# Patient Record
Sex: FEMALE | Race: WHITE | NOT HISPANIC OR LATINO | Employment: OTHER | URBAN - METROPOLITAN AREA
[De-identification: names, ages, dates, MRNs, and addresses within clinical notes are randomized per-mention and may not be internally consistent; named-entity substitution may affect disease eponyms.]

---

## 2017-03-14 DIAGNOSIS — I10 ESSENTIAL HYPERTENSION: ICD-10-CM

## 2017-03-14 RX ORDER — RAMIPRIL 10 MG/1
CAPSULE ORAL
Qty: 90 CAPSULE | Refills: 1 | Status: SHIPPED | OUTPATIENT
Start: 2017-03-14 | End: 2017-10-02 | Stop reason: SDUPTHER

## 2017-06-15 ENCOUNTER — OFFICE VISIT (OUTPATIENT)
Dept: INTERNAL MEDICINE | Facility: CLINIC | Age: 67
End: 2017-06-15

## 2017-06-15 VITALS
BODY MASS INDEX: 31.78 KG/M2 | DIASTOLIC BLOOD PRESSURE: 80 MMHG | WEIGHT: 222 LBS | SYSTOLIC BLOOD PRESSURE: 140 MMHG | HEIGHT: 70 IN

## 2017-06-15 DIAGNOSIS — M25.50 MULTIPLE JOINT PAIN: ICD-10-CM

## 2017-06-15 DIAGNOSIS — R07.9 CHEST PAIN AT REST: Primary | ICD-10-CM

## 2017-06-15 DIAGNOSIS — E78.49 OTHER HYPERLIPIDEMIA: ICD-10-CM

## 2017-06-15 DIAGNOSIS — I10 ESSENTIAL HYPERTENSION: ICD-10-CM

## 2017-06-15 DIAGNOSIS — G47.33 OBSTRUCTIVE SLEEP APNEA SYNDROME: ICD-10-CM

## 2017-06-15 DIAGNOSIS — R00.2 PALPITATIONS: ICD-10-CM

## 2017-06-15 DIAGNOSIS — Z86.010 HX OF COLONIC POLYPS: ICD-10-CM

## 2017-06-15 DIAGNOSIS — K58.1 IRRITABLE BOWEL SYNDROME WITH CONSTIPATION: ICD-10-CM

## 2017-06-15 DIAGNOSIS — F51.01 PRIMARY INSOMNIA: ICD-10-CM

## 2017-06-15 DIAGNOSIS — K21.9 GASTROESOPHAGEAL REFLUX DISEASE, ESOPHAGITIS PRESENCE NOT SPECIFIED: ICD-10-CM

## 2017-06-15 DIAGNOSIS — Z12.31 ENCOUNTER FOR SCREENING MAMMOGRAM FOR BREAST CANCER: ICD-10-CM

## 2017-06-15 LAB
ALBUMIN SERPL-MCNC: 3.84 G/DL (ref 3.4–4.6)
ALBUMIN/GLOB SERPL: 1.3 G/DL
ALP SERPL-CCNC: 81 U/L (ref 46–116)
ALT SERPL W P-5'-P-CCNC: 31 U/L (ref 14–59)
ANION GAP SERPL CALCULATED.3IONS-SCNC: 10 MMOL/L
AST SERPL-CCNC: 25 U/L (ref 7–37)
BILIRUB SERPL-MCNC: 0.4 MG/DL (ref 0.2–1)
BUN BLD-MCNC: 21 MG/DL (ref 6–22)
BUN/CREAT SERPL: 25.6 (ref 7–25)
CALCIUM SPEC-SCNC: 8.9 MG/DL (ref 8.6–10.5)
CHLORIDE SERPL-SCNC: 105 MMOL/L (ref 95–107)
CO2 SERPL-SCNC: 28 MMOL/L (ref 23–32)
CREAT BLD-MCNC: 0.82 MG/DL (ref 0.55–1.02)
GFR SERPL CREATININE-BSD FRML MDRD: 70 ML/MIN/1.73
GLOBULIN UR ELPH-MCNC: 3 GM/DL
GLUCOSE BLD-MCNC: 93 MG/DL (ref 70–100)
POTASSIUM BLD-SCNC: 4.3 MMOL/L (ref 3.3–5.3)
PROT SERPL-MCNC: 6.8 G/DL (ref 6.3–8.4)
SODIUM BLD-SCNC: 143 MMOL/L (ref 136–145)

## 2017-06-15 PROCEDURE — 99214 OFFICE O/P EST MOD 30 MIN: CPT | Performed by: INTERNAL MEDICINE

## 2017-06-15 PROCEDURE — 93000 ELECTROCARDIOGRAM COMPLETE: CPT | Performed by: INTERNAL MEDICINE

## 2017-06-15 PROCEDURE — 80053 COMPREHEN METABOLIC PANEL: CPT | Performed by: INTERNAL MEDICINE

## 2017-06-15 NOTE — PROGRESS NOTES
"Subjective   Valery Zabala is a 66 y.o. female here for   Chief Complaint   Patient presents with   • Hyperlipidemia     6 month follow-up   • Hypertension     6 month follow-up   .    Vitals:    06/15/17 0812   BP: 140/80   BP Location: Left arm   Patient Position: Sitting   Cuff Size: Adult   Weight: 222 lb (101 kg)   Height: 70\" (177.8 cm)       Hyperlipidemia   This is a chronic problem. The current episode started more than 1 year ago. The problem is controlled. Recent lipid tests were reviewed and are normal. She has no history of chronic renal disease, diabetes, hypothyroidism or liver disease. Associated symptoms include chest pain. Pertinent negatives include no shortness of breath.   Hypertension   This is a chronic problem. The current episode started more than 1 year ago. The problem is unchanged. The problem is controlled. Associated symptoms include chest pain and palpitations (off & on for yrs - not assoc'd with c/p). Pertinent negatives include no shortness of breath. There is no history of chronic renal disease.   Chest Pain    This is a recurrent problem. The current episode started more than 1 month ago. The onset quality is gradual. The problem occurs rarely. The problem has been waxing and waning. The pain is present in the substernal region. The pain is mild. The quality of the pain is described as burning. The pain does not radiate. Associated symptoms include palpitations (off & on for yrs - not assoc'd with c/p). Pertinent negatives include no cough, fever, nausea or shortness of breath. The pain is aggravated by nothing. She has tried nothing for the symptoms.   Her past medical history is significant for hyperlipidemia.   Pertinent negatives for past medical history include no diabetes.        Encounter Diagnoses   Name Primary?   • Chest pain at rest Yes   • Other hyperlipidemia    • Essential hypertension    • Irritable bowel syndrome with constipation    • Multiple joint pain    • " Primary insomnia    • Palpitations    • Obstructive sleep apnea syndrome    • Hx of colonic polyps    • Gastroesophageal reflux disease, esophagitis presence not specified    • Encounter for screening mammogram for breast cancer        The following portions of the patient's history were reviewed and updated as appropriate: allergies, current medications, past social history and problem list.    Review of Systems   Constitutional: Positive for fatigue. Negative for chills and fever.   Respiratory: Negative for cough, shortness of breath and wheezing.    Cardiovascular: Positive for chest pain and palpitations (off & on for yrs - not assoc'd with c/p). Negative for leg swelling.   Gastrointestinal: Negative for nausea.   Musculoskeletal: Positive for arthralgias.   Psychiatric/Behavioral: Positive for sleep disturbance (ok with doxepin). Negative for dysphoric mood. The patient is not nervous/anxious.        Objective   Physical Exam   Constitutional: She appears well-developed and well-nourished. No distress.   Cardiovascular: Normal rate, regular rhythm and normal heart sounds.    Pulmonary/Chest: No respiratory distress. She has no wheezes. She has no rales. She exhibits no tenderness.   Musculoskeletal: She exhibits no edema.   Psychiatric: She has a normal mood and affect. Her behavior is normal.   Nursing note and vitals reviewed.      ECG 12 Lead  Date/Time: 6/15/2017 9:13 AM  Performed by: AMBER JACKSON  Authorized by: AMBER JACKSON   Interpreted by ED physician  Previous ECG: no previous ECG available  Rhythm: sinus rhythm  Rate: normal  Conduction: conduction normal  ST Segments: ST segments normal  T Waves: T waves normal  QRS axis: normal  Other: no other findings  Clinical impression: normal ECG            Assessment/Plan   Problem List Items Addressed This Visit        Unprioritized    Hypertension    Relevant Orders    Lipoprotein NMR    Comprehensive Metabolic Panel    Hyperlipidemia     Relevant Orders    Lipoprotein NMR    Comprehensive Metabolic Panel    Hx of colonic polyps    Palpitations    GERD (gastroesophageal reflux disease)    IBS (irritable bowel syndrome)    Insomnia    Sleep apnea     Wears CPAP nightly - helps fatigue.         Multiple joint pain      Other Visit Diagnoses     Chest pain at rest    -  Primary    Relevant Orders    Ambulatory Referral to Cardiology    Encounter for screening mammogram for breast cancer        Relevant Orders    Mammo Screening Bilateral With CAD       Chest pain (atypical) - EKG normal today.  Refer to cardiologist.   HTN - stable.  Call if bp over 140/90.   High chol - need diet & exercise.   Joint pain - f/u with ortho.     Screening carotid u/s showed mild to moderate stenosis (4/3/2017 at [facility]).  Discuss with cardiologist.      Return 4-6 mos for f/u & wellness.

## 2017-06-18 LAB
CHOLEST SERPL-MCNC: 208 MG/DL (ref 100–199)
HDL SERPL-SCNC: 31.7 UMOL/L
HDLC SERPL-MCNC: 53 MG/DL
LDL-P: 1542 NMOL/L
LDLC REAL SIZE PAT SERPL: 21.3 NM
LDLC SERPL CALC-MCNC: 138 MG/DL (ref 0–99)
LP-IR SCORE**: 41
SMALL LDL-P: 648 NMOL/L
TRIGL SERPL-MCNC: 85 MG/DL (ref 0–149)

## 2017-07-27 DIAGNOSIS — Z12.31 ENCOUNTER FOR SCREENING MAMMOGRAM FOR BREAST CANCER: Primary | ICD-10-CM

## 2017-08-17 ENCOUNTER — OFFICE VISIT (OUTPATIENT)
Dept: CARDIOLOGY | Facility: CLINIC | Age: 67
End: 2017-08-17

## 2017-08-17 VITALS
SYSTOLIC BLOOD PRESSURE: 160 MMHG | HEIGHT: 71 IN | WEIGHT: 223 LBS | BODY MASS INDEX: 31.22 KG/M2 | HEART RATE: 103 BPM | DIASTOLIC BLOOD PRESSURE: 80 MMHG

## 2017-08-17 DIAGNOSIS — R00.2 PALPITATIONS: ICD-10-CM

## 2017-08-17 DIAGNOSIS — E78.49 OTHER HYPERLIPIDEMIA: ICD-10-CM

## 2017-08-17 DIAGNOSIS — I10 ESSENTIAL HYPERTENSION: ICD-10-CM

## 2017-08-17 DIAGNOSIS — R07.2 PRECORDIAL PAIN: Primary | ICD-10-CM

## 2017-08-17 DIAGNOSIS — R01.1 MURMUR: ICD-10-CM

## 2017-08-17 DIAGNOSIS — G47.33 OBSTRUCTIVE SLEEP APNEA SYNDROME: ICD-10-CM

## 2017-08-17 PROCEDURE — 93000 ELECTROCARDIOGRAM COMPLETE: CPT | Performed by: INTERNAL MEDICINE

## 2017-08-17 PROCEDURE — 99204 OFFICE O/P NEW MOD 45 MIN: CPT | Performed by: INTERNAL MEDICINE

## 2017-08-17 NOTE — PROGRESS NOTES
Date of Office Visit: 2017  Encounter Provider: Luanne Levin MD  Place of Service: [facility] CARDIOLOGY  Patient Name: Valery Zabala  :1950      Patient ID:  Valery Zabala is a 67 y.o. female is here for chest pain, hypertension.         History of Present Illness    This is a pleasant female coming in for chest pain.  She describes the chest pain as coming on sometimes with activity but sometimes it is also worse when she's having problems with irritable bowel including gas and constipation.  She has stress study in  and at that time it was normal.  She's not had a workup since then.  She is treated for hypertension.  She's on Ultrase.  Her blood pressure tends to run 140s over 80s.  She has multiple medication allergies.  She has hyperlipidemia but it's ever been treated.  Her last lipid panel was in 2017 showing H show 53 and .  Her CMP at that time was normal.    She has obstructive sleep apnea and uses a BiPAP for that.  She's had a history of rheumatic fever.  She has no diabetes nails.  SNOMED myocardial infarction.  She has irritable bowel syndrome and gastroscope reflux disease.  She will of Protonix because of long-term issues with osteoporosis.  She takes Linzess for the irritable bowel syndrome.  She was taking Zantac but had some vomiting with this.  When she lost weight her GERD got better and the vomiting got better.    She has a history of kidney disease, asthma, emphysema, seizures, stroke, blood clot cancer.    She does have constant knee pain for which she gets steroid injections because of the severe valgus abnormality.  She is rather alcohol and does not smoke.  She is  has 2 stepchildren.    Her father had coronary bypass grafting.  Her mother had arrhythmia.  Her maternal grandfather  myocardial infarction in his 70s.    She vascular screening done 2017 and this did show mild bilateral carotid disease.  Her  abdominal aorta was normal and her peripheral vasculature was normal.    She is sedentary and has dyspnea with exertion.  Her energy level is stable.       Past Medical History:   Diagnosis Date   • Chest pain    • Chest pain at rest    • Essential hypertension    • Fatigue    • GERD (gastroesophageal reflux disease)    • History of anemia    • Hx of colonic polyps    • Hyperlipidemia    • Hypertension    • IBS (irritable bowel syndrome)    • Insomnia    • Irritable bowel syndrome with constipation    • Multiple joint pain    • MARANDA (obstructive sleep apnea)    • Palpitations    • Primary insomnia    • Rheumatic fever    • Sleep apnea     c pap   • Sleep disturbance    • Vitamin D deficiency          Past Surgical History:   Procedure Laterality Date   • APPENDECTOMY     • ARTERIOVENOUS FISTULA REPAIR  1986   • EYE SURGERY     • HYSTERECTOMY  1994   • SHOULDER SURGERY  1997   • TONSILLECTOMY         Current Outpatient Prescriptions on File Prior to Visit   Medication Sig Dispense Refill   • acyclovir (ZOVIRAX) 200 MG capsule TAKE 1 CAPSULE DAILY 90 capsule 2   • cholecalciferol (VITAMIN D3) 1000 UNITS tablet Take 2,000 Units by mouth daily.     • cycloSPORINE (RESTASIS) 0.05 % ophthalmic emulsion 1 drop 2 (two) times a day.     • docusate sodium (COLACE) 250 MG capsule Take 250 mg by mouth daily.     • doxepin (SINEquan) 25 MG capsule TAKE 1 CAPSULE DAILY 90 capsule 2   • fluticasone (FLONASE) 50 MCG/ACT nasal spray 2 sprays into each nostril daily. Administer 2 sprays in each nostril for each dose. 3 each 4   • Linaclotide (LINZESS) 145 MCG capsule Take 1 capsule by mouth Daily.     • Loratadine (CLARITIN) 10 MG capsule Take 1 capsule by mouth Daily.     • ramipril (ALTACE) 10 MG capsule TAKE 1 CAPSULE DAILY 90 capsule 1   • ranitidine (ZANTAC) 150 MG tablet Take 150 mg by mouth 2 (two) times a day.       No current facility-administered medications on file prior to visit.        Social History     Social History  "  • Marital status:      Spouse name: N/A   • Number of children: N/A   • Years of education: N/A     Occupational History   • Not on file.     Social History Main Topics   • Smoking status: Never Smoker   • Smokeless tobacco: Never Used      Comment: caffeine use   • Alcohol use Yes      Comment: rare   • Drug use: No   • Sexual activity: No     Other Topics Concern   • Not on file     Social History Narrative           Review of Systems   Constitution: Negative.   HENT: Negative for congestion and headaches.    Eyes: Negative for vision loss in left eye and vision loss in right eye.   Cardiovascular: Positive for chest pain, dyspnea on exertion and irregular heartbeat.   Respiratory: Negative.  Negative for cough, hemoptysis, shortness of breath, sleep disturbances due to breathing, snoring, sputum production and wheezing.    Endocrine: Negative.    Hematologic/Lymphatic: Negative.    Skin: Negative for poor wound healing and rash.   Musculoskeletal: Negative for falls, gout, muscle cramps and myalgias.   Gastrointestinal: Negative for abdominal pain, diarrhea, dysphagia, hematemesis, melena, nausea and vomiting.   Neurological: Negative for excessive daytime sleepiness, dizziness, light-headedness, loss of balance, seizures and vertigo.   Psychiatric/Behavioral: Negative for depression and substance abuse. The patient is not nervous/anxious.        Procedures    ECG 12 Lead  Date/Time: 8/17/2017 9:27 AM  Performed by: AFRICA COBB  Authorized by: AFRICA COBB   Comparison: compared with previous ECG   Similar to previous ECG  Rhythm: sinus rhythm  Clinical impression: normal ECG               Objective:      Vitals:    08/17/17 0831 08/17/17 0838   BP: 170/80 160/80   BP Location: Right arm Left arm   Patient Position: Sitting Sitting   Pulse: 103    Weight: 223 lb (101 kg)    Height: 71\" (180.3 cm)      Body mass index is 31.1 kg/(m^2).    Physical Exam   Constitutional: She is oriented to " person, place, and time. She appears well-developed and well-nourished. No distress.   HENT:   Head: Normocephalic and atraumatic.   Eyes: Conjunctivae are normal. No scleral icterus.   Neck: Neck supple. No JVD present. Carotid bruit is not present. No thyromegaly present.   Cardiovascular: Normal rate, regular rhythm, S1 normal, S2 normal and intact distal pulses.   No extrasystoles are present. PMI is not displaced.  Exam reveals no gallop.    Murmur heard.   Midsystolic murmur is present with a grade of 3/6  at the upper right sternal border, upper left sternal border  Pulses:       Carotid pulses are 2+ on the right side with bruit, and 2+ on the left side with bruit.       Radial pulses are 2+ on the right side, and 2+ on the left side.        Dorsalis pedis pulses are 2+ on the right side, and 2+ on the left side.        Posterior tibial pulses are 2+ on the right side, and 2+ on the left side.   Pulmonary/Chest: Effort normal and breath sounds normal. No respiratory distress. She has no wheezes. She has no rhonchi. She has no rales. She exhibits no tenderness.   Abdominal: Soft. Bowel sounds are normal. She exhibits no distension, no abdominal bruit and no mass. There is no tenderness.   Musculoskeletal: She exhibits no edema or deformity.   Lymphadenopathy:     She has no cervical adenopathy.   Neurological: She is alert and oriented to person, place, and time. No cranial nerve deficit.   Skin: Skin is warm and dry. No rash noted. She is not diaphoretic. No cyanosis. No pallor. Nails show no clubbing.   Psychiatric: She has a normal mood and affect. Judgment normal.   Vitals reviewed.      Lab Review:       Assessment:      Diagnosis Plan   1. Precordial pain     2. Essential hypertension     3. Other hyperlipidemia     4. Palpitations     5. Obstructive sleep apnea syndrome       1. Chest pain, atypical but has risks and dyspnea on exertion, set up stress nuclear study.  She is not sure that she can walk  due to knee problems.   2. Hypertension, try bystolic 5mg daily.   3. Hyperlipidemia, untreated.   4. Murmur, set up echo.  5. Bilateral carotid bruits.      Plan:       See back in 3 months.

## 2017-08-23 ENCOUNTER — HOSPITAL ENCOUNTER (OUTPATIENT)
Dept: CARDIOLOGY | Facility: HOSPITAL | Age: 67
Discharge: HOME | End: 2017-08-23
Attending: INTERNAL MEDICINE | Admitting: INTERNAL MEDICINE

## 2017-08-23 ENCOUNTER — HOSPITAL ENCOUNTER (OUTPATIENT)
Dept: CARDIOLOGY | Facility: HOSPITAL | Age: 67
Discharge: HOME | End: 2017-08-23
Attending: INTERNAL MEDICINE

## 2017-08-23 VITALS
WEIGHT: 223 LBS | SYSTOLIC BLOOD PRESSURE: 128 MMHG | HEIGHT: 71 IN | DIASTOLIC BLOOD PRESSURE: 78 MMHG | HEART RATE: 82 BPM | BODY MASS INDEX: 31.22 KG/M2

## 2017-08-23 DIAGNOSIS — I10 ESSENTIAL HYPERTENSION: ICD-10-CM

## 2017-08-23 DIAGNOSIS — E78.49 OTHER HYPERLIPIDEMIA: ICD-10-CM

## 2017-08-23 DIAGNOSIS — R01.1 MURMUR: ICD-10-CM

## 2017-08-23 DIAGNOSIS — R07.2 PRECORDIAL PAIN: ICD-10-CM

## 2017-08-23 LAB
ASCENDING AORTA: 2.9 CM
BH CV ECHO MEAS - ACS: 1.8 CM
BH CV ECHO MEAS - AO MAX PG (FULL): 4.4 MMHG
BH CV ECHO MEAS - AO MAX PG: 9.7 MMHG
BH CV ECHO MEAS - AO MEAN PG (FULL): 3.3 MMHG
BH CV ECHO MEAS - AO MEAN PG: 7 MMHG
BH CV ECHO MEAS - AO ROOT AREA (BSA CORRECTED): 1.4
BH CV ECHO MEAS - AO ROOT AREA: 7.5 CM^2
BH CV ECHO MEAS - AO ROOT DIAM: 3.1 CM
BH CV ECHO MEAS - AO V2 MAX: 155.7 CM/SEC
BH CV ECHO MEAS - AO V2 MEAN: 129.4 CM/SEC
BH CV ECHO MEAS - AO V2 VTI: 40.1 CM
BH CV ECHO MEAS - AVA(I,A): 2.4 CM^2
BH CV ECHO MEAS - AVA(I,D): 2.4 CM^2
BH CV ECHO MEAS - AVA(V,A): 2.3 CM^2
BH CV ECHO MEAS - AVA(V,D): 2.3 CM^2
BH CV ECHO MEAS - BSA(HAYCOCK): 2.3 M^2
BH CV ECHO MEAS - BSA: 2.2 M^2
BH CV ECHO MEAS - BZI_BMI: 31.1 KILOGRAMS/M^2
BH CV ECHO MEAS - BZI_METRIC_HEIGHT: 180.3 CM
BH CV ECHO MEAS - BZI_METRIC_WEIGHT: 101.2 KG
BH CV ECHO MEAS - CONTRAST EF (2CH): 62 ML/M^2
BH CV ECHO MEAS - CONTRAST EF 4CH: 65.3 ML/M^2
BH CV ECHO MEAS - EDV(MOD-SP2): 100 ML
BH CV ECHO MEAS - EDV(MOD-SP4): 101 ML
BH CV ECHO MEAS - EDV(TEICH): 195.6 ML
BH CV ECHO MEAS - EF(CUBED): 70.4 %
BH CV ECHO MEAS - EF(MOD-SP2): 62 %
BH CV ECHO MEAS - EF(MOD-SP4): 65.3 %
BH CV ECHO MEAS - EF(TEICH): 61 %
BH CV ECHO MEAS - ESV(MOD-SP2): 38 ML
BH CV ECHO MEAS - ESV(MOD-SP4): 35 ML
BH CV ECHO MEAS - ESV(TEICH): 76.3 ML
BH CV ECHO MEAS - FS: 33.3 %
BH CV ECHO MEAS - IVS/LVPW: 1
BH CV ECHO MEAS - IVSD: 1 CM
BH CV ECHO MEAS - LAT PEAK E' VEL: 10 CM/SEC
BH CV ECHO MEAS - LV DIASTOLIC VOL/BSA (35-75): 45.7 ML/M^2
BH CV ECHO MEAS - LV MASS(C)D: 262.2 GRAMS
BH CV ECHO MEAS - LV MASS(C)DI: 118.7 GRAMS/M^2
BH CV ECHO MEAS - LV MAX PG: 5.3 MMHG
BH CV ECHO MEAS - LV MEAN PG: 3.7 MMHG
BH CV ECHO MEAS - LV SYSTOLIC VOL/BSA (12-30): 15.8 ML/M^2
BH CV ECHO MEAS - LV V1 MAX: 115 CM/SEC
BH CV ECHO MEAS - LV V1 MEAN: 92.7 CM/SEC
BH CV ECHO MEAS - LV V1 VTI: 30.7 CM
BH CV ECHO MEAS - LVIDD: 6.2 CM
BH CV ECHO MEAS - LVIDS: 4.1 CM
BH CV ECHO MEAS - LVLD AP2: 7.4 CM
BH CV ECHO MEAS - LVLD AP4: 8 CM
BH CV ECHO MEAS - LVLS AP2: 6.2 CM
BH CV ECHO MEAS - LVLS AP4: 6.8 CM
BH CV ECHO MEAS - LVOT AREA (M): 3.1 CM^2
BH CV ECHO MEAS - LVOT AREA: 3.1 CM^2
BH CV ECHO MEAS - LVOT DIAM: 2 CM
BH CV ECHO MEAS - LVPWD: 1 CM
BH CV ECHO MEAS - MED PEAK E' VEL: 10 CM/SEC
BH CV ECHO MEAS - MR MAX PG: 30 MMHG
BH CV ECHO MEAS - MR MAX VEL: 273.6 CM/SEC
BH CV ECHO MEAS - MV A DUR: 0.11 SEC
BH CV ECHO MEAS - MV A MAX VEL: 91.2 CM/SEC
BH CV ECHO MEAS - MV DEC SLOPE: 584.8 CM/SEC^2
BH CV ECHO MEAS - MV DEC TIME: 0.18 SEC
BH CV ECHO MEAS - MV E MAX VEL: 101.9 CM/SEC
BH CV ECHO MEAS - MV E/A: 1.1
BH CV ECHO MEAS - MV MAX PG: 4.1 MMHG
BH CV ECHO MEAS - MV MEAN PG: 2.4 MMHG
BH CV ECHO MEAS - MV P1/2T MAX VEL: 100.4 CM/SEC
BH CV ECHO MEAS - MV P1/2T: 50.3 MSEC
BH CV ECHO MEAS - MV V2 MAX: 101 CM/SEC
BH CV ECHO MEAS - MV V2 MEAN: 74 CM/SEC
BH CV ECHO MEAS - MV V2 VTI: 31.8 CM
BH CV ECHO MEAS - MVA P1/2T LCG: 2.2 CM^2
BH CV ECHO MEAS - MVA(P1/2T): 4.4 CM^2
BH CV ECHO MEAS - MVA(VTI): 3 CM^2
BH CV ECHO MEAS - PA ACC TIME: 0.14 SEC
BH CV ECHO MEAS - PA MAX PG (FULL): 1.3 MMHG
BH CV ECHO MEAS - PA MAX PG: 3.5 MMHG
BH CV ECHO MEAS - PA PR(ACCEL): 14 MMHG
BH CV ECHO MEAS - PA V2 MAX: 93.4 CM/SEC
BH CV ECHO MEAS - PULM A REVS DUR: 0.12 SEC
BH CV ECHO MEAS - PULM A REVS VEL: 28.1 CM/SEC
BH CV ECHO MEAS - PULM DIAS VEL: 37.3 CM/SEC
BH CV ECHO MEAS - PULM S/D: 1.9
BH CV ECHO MEAS - PULM SYS VEL: 71.8 CM/SEC
BH CV ECHO MEAS - PVA(V,A): 2.8 CM^2
BH CV ECHO MEAS - PVA(V,D): 2.8 CM^2
BH CV ECHO MEAS - QP/QS: 0.58
BH CV ECHO MEAS - RAP SYSTOLE: 3 MMHG
BH CV ECHO MEAS - RV MAX PG: 2.2 MMHG
BH CV ECHO MEAS - RV MEAN PG: 1.3 MMHG
BH CV ECHO MEAS - RV V1 MAX: 74.7 CM/SEC
BH CV ECHO MEAS - RV V1 MEAN: 53.3 CM/SEC
BH CV ECHO MEAS - RV V1 VTI: 16 CM
BH CV ECHO MEAS - RVOT AREA: 3.5 CM^2
BH CV ECHO MEAS - RVOT DIAM: 2.1 CM
BH CV ECHO MEAS - RVSP: 33 MMHG
BH CV ECHO MEAS - SI(AO): 136.1 ML/M^2
BH CV ECHO MEAS - SI(CUBED): 76.8 ML/M^2
BH CV ECHO MEAS - SI(LVOT): 43.4 ML/M^2
BH CV ECHO MEAS - SI(MOD-SP2): 28.1 ML/M^2
BH CV ECHO MEAS - SI(MOD-SP4): 29.9 ML/M^2
BH CV ECHO MEAS - SI(TEICH): 54 ML/M^2
BH CV ECHO MEAS - SUP REN AO DIAM: 2.1 CM
BH CV ECHO MEAS - SV(AO): 300.6 ML
BH CV ECHO MEAS - SV(CUBED): 169.6 ML
BH CV ECHO MEAS - SV(LVOT): 95.9 ML
BH CV ECHO MEAS - SV(MOD-SP2): 62 ML
BH CV ECHO MEAS - SV(MOD-SP4): 66 ML
BH CV ECHO MEAS - SV(RVOT): 55.3 ML
BH CV ECHO MEAS - SV(TEICH): 119.3 ML
BH CV ECHO MEAS - TAPSE (>1.6): 2.3 CM2
BH CV ECHO MEAS - TR MAX VEL: 271.9 CM/SEC
BH CV NUCLEAR PRIOR STUDY: 3
BH CV STRESS BP STAGE 1: NORMAL
BH CV STRESS BP STAGE 2: NORMAL
BH CV STRESS DURATION MIN STAGE 1: 3
BH CV STRESS DURATION MIN STAGE 2: 3
BH CV STRESS DURATION SEC STAGE 1: 0
BH CV STRESS DURATION SEC STAGE 2: 0
BH CV STRESS GRADE STAGE 1: 10
BH CV STRESS GRADE STAGE 2: 12
BH CV STRESS HR STAGE 1: 126
BH CV STRESS HR STAGE 2: 148
BH CV STRESS METS STAGE 1: 5
BH CV STRESS METS STAGE 2: 7.5
BH CV STRESS PROTOCOL 1: NORMAL
BH CV STRESS RECOVERY BP: NORMAL MMHG
BH CV STRESS RECOVERY HR: 99 BPM
BH CV STRESS SPEED STAGE 1: 1.7
BH CV STRESS SPEED STAGE 2: 2.5
BH CV STRESS STAGE 1: 1
BH CV STRESS STAGE 2: 2
BH CV XLRA - RV BASE: 3 CM
BH CV XLRA - TDI S': 13 CM/SEC
E/E' RATIO: 10
LEFT ATRIUM VOLUME INDEX: 30 ML/M2
LV EF 2D ECHO EST: 65 %
LV EF NUC BP: 70 %
MAXIMAL PREDICTED HEART RATE: 153 BPM
PERCENT MAX PREDICTED HR: 96.73 %
SINUS: 2.8 CM
STJ: 2.7 CM
STRESS BASELINE BP: NORMAL MMHG
STRESS BASELINE HR: 83 BPM
STRESS PERCENT HR: 114 %
STRESS POST ESTIMATED WORKLOAD: 7.5 METS
STRESS POST EXERCISE DUR MIN: 6 MIN
STRESS POST EXERCISE DUR SEC: 0 SEC
STRESS POST PEAK BP: NORMAL MMHG
STRESS POST PEAK HR: 148 BPM
STRESS TARGET HR: 130 BPM

## 2017-08-23 PROCEDURE — 93017 CV STRESS TEST TRACING ONLY: CPT

## 2017-08-23 PROCEDURE — A9502 TC99M TETROFOSMIN: HCPCS | Performed by: INTERNAL MEDICINE

## 2017-08-23 PROCEDURE — 78452 HT MUSCLE IMAGE SPECT MULT: CPT

## 2017-08-23 PROCEDURE — 93306 TTE W/DOPPLER COMPLETE: CPT | Performed by: INTERNAL MEDICINE

## 2017-08-23 PROCEDURE — 93016 CV STRESS TEST SUPVJ ONLY: CPT | Performed by: INTERNAL MEDICINE

## 2017-08-23 PROCEDURE — 0 TECHNETIUM TETROFOSMIN KIT: Performed by: INTERNAL MEDICINE

## 2017-08-23 PROCEDURE — 93018 CV STRESS TEST I&R ONLY: CPT | Performed by: INTERNAL MEDICINE

## 2017-08-23 PROCEDURE — 78452 HT MUSCLE IMAGE SPECT MULT: CPT | Performed by: INTERNAL MEDICINE

## 2017-08-23 PROCEDURE — 93306 TTE W/DOPPLER COMPLETE: CPT

## 2017-08-23 RX ADMIN — TETROFOSMIN 1 DOSE: 1.38 INJECTION, POWDER, LYOPHILIZED, FOR SOLUTION INTRAVENOUS at 11:45

## 2017-08-23 RX ADMIN — TETROFOSMIN 1 DOSE: 1.38 INJECTION, POWDER, LYOPHILIZED, FOR SOLUTION INTRAVENOUS at 10:40

## 2017-09-15 ENCOUNTER — TELEPHONE (OUTPATIENT)
Dept: CARDIOLOGY | Facility: CLINIC | Age: 67
End: 2017-09-15

## 2017-09-15 NOTE — TELEPHONE ENCOUNTER
Pt called and states that since starting her with Bystolic 5mg, her legs are swelling, sleeping a lot in the afternoon, fatigue. Pt denies SOA, chest pain. BP reading 114/63, 134/66. Pt wants to know if she can stop  her Bystolic and if she can come in and see you to discusse other option?........please advise    Last OV 8/17/17 and next OV 11/21/17    Thanks  Peg HERRERA

## 2017-10-02 DIAGNOSIS — I10 ESSENTIAL HYPERTENSION: ICD-10-CM

## 2017-10-02 RX ORDER — RAMIPRIL 10 MG/1
CAPSULE ORAL
Qty: 90 CAPSULE | Refills: 3 | Status: SHIPPED | OUTPATIENT
Start: 2017-10-02 | End: 2018-09-25 | Stop reason: SDUPTHER

## 2017-10-02 RX ORDER — DOXEPIN HYDROCHLORIDE 25 MG/1
CAPSULE ORAL
Qty: 90 CAPSULE | Refills: 2 | Status: SHIPPED | OUTPATIENT
Start: 2017-10-02 | End: 2018-04-29 | Stop reason: SDUPTHER

## 2017-10-02 RX ORDER — ACYCLOVIR 200 MG/1
CAPSULE ORAL
Qty: 90 CAPSULE | Refills: 2 | Status: SHIPPED | OUTPATIENT
Start: 2017-10-02 | End: 2018-04-29 | Stop reason: SDUPTHER

## 2017-10-13 RX ORDER — NEBIVOLOL 5 MG/1
2.5 TABLET ORAL DAILY
Qty: 28 TABLET | Refills: 0 | Status: SHIPPED | OUTPATIENT
Start: 2017-10-13 | End: 2017-11-27 | Stop reason: SDUPTHER

## 2017-11-02 ENCOUNTER — OFFICE VISIT (OUTPATIENT)
Dept: INTERNAL MEDICINE | Facility: CLINIC | Age: 67
End: 2017-11-02

## 2017-11-02 VITALS
OXYGEN SATURATION: 98 % | HEART RATE: 71 BPM | TEMPERATURE: 97.8 F | BODY MASS INDEX: 33.21 KG/M2 | HEIGHT: 70 IN | DIASTOLIC BLOOD PRESSURE: 78 MMHG | WEIGHT: 232 LBS | SYSTOLIC BLOOD PRESSURE: 118 MMHG | RESPIRATION RATE: 17 BRPM

## 2017-11-02 DIAGNOSIS — I10 ESSENTIAL HYPERTENSION: Primary | ICD-10-CM

## 2017-11-02 DIAGNOSIS — M25.50 MULTIPLE JOINT PAIN: ICD-10-CM

## 2017-11-02 DIAGNOSIS — Z00.00 INITIAL MEDICARE ANNUAL WELLNESS VISIT: ICD-10-CM

## 2017-11-02 DIAGNOSIS — E55.9 VITAMIN D DEFICIENCY: ICD-10-CM

## 2017-11-02 DIAGNOSIS — G47.33 OBSTRUCTIVE SLEEP APNEA SYNDROME: ICD-10-CM

## 2017-11-02 DIAGNOSIS — R00.2 PALPITATIONS: ICD-10-CM

## 2017-11-02 DIAGNOSIS — K59.01 SLOW TRANSIT CONSTIPATION: ICD-10-CM

## 2017-11-02 DIAGNOSIS — E78.49 OTHER HYPERLIPIDEMIA: ICD-10-CM

## 2017-11-02 DIAGNOSIS — K21.9 GASTROESOPHAGEAL REFLUX DISEASE, ESOPHAGITIS PRESENCE NOT SPECIFIED: ICD-10-CM

## 2017-11-02 DIAGNOSIS — Z86.010 HX OF COLONIC POLYPS: ICD-10-CM

## 2017-11-02 DIAGNOSIS — Z78.0 MENOPAUSE: ICD-10-CM

## 2017-11-02 DIAGNOSIS — K58.1 IRRITABLE BOWEL SYNDROME WITH CONSTIPATION: ICD-10-CM

## 2017-11-02 DIAGNOSIS — F51.01 PRIMARY INSOMNIA: ICD-10-CM

## 2017-11-02 PROBLEM — R07.2 PRECORDIAL PAIN: Status: RESOLVED | Noted: 2017-08-17 | Resolved: 2017-11-02

## 2017-11-02 PROCEDURE — G0438 PPPS, INITIAL VISIT: HCPCS | Performed by: INTERNAL MEDICINE

## 2017-11-02 PROCEDURE — 99213 OFFICE O/P EST LOW 20 MIN: CPT | Performed by: INTERNAL MEDICINE

## 2017-11-02 NOTE — PATIENT INSTRUCTIONS
Medicare Wellness  Personal Prevention Plan of Service     Date of Office Visit:  2017  Encounter Provider:  Julianna Howell MD  Place of Service:  [facility] INTERNAL MEDICINE  Patient Name: Valery Zabala  :  1950    As part of the Medicare Wellness portion of your visit today, we are providing you with this personalized preventive plan of services (PPPS). This plan is based upon recommendations of the [place] Preventive Services Task Force (USPSTF) and the Advisory Committee on Immunization Practices (ACIP).    This lists the preventive care services that should be considered, and provides dates of when you are due. Items listed as completed are up-to-date and do not require any further intervention.    Health Maintenance   Topic Date Due   • DXA SCAN  2017   • PNEUMOCOCCAL VACCINES (65+ LOW/MEDIUM RISK) (2 of 2 - PPSV23) 2018   • LIPID PANEL  06/15/2018   • MEDICARE ANNUAL WELLNESS  2018   • MAMMOGRAM  2019   • COLONOSCOPY  2023   • TDAP/TD VACCINES (2 - Td) 2025   • HEPATITIS C SCREENING  Completed   • INFLUENZA VACCINE  Completed   • ZOSTER VACCINE  Completed       Orders Placed This Encounter   Procedures   • DEXA Bone Density Axial     Standing Status:   Future     Standing Expiration Date:   2018     Order Specific Question:   Reason for Exam:     Answer:   menopause       Return in about 6 months (around 2018) for Recheck.

## 2017-11-02 NOTE — PROGRESS NOTES
QUICK REFERENCE INFORMATION:  The ABCs of the Annual Wellness Visit    Initial Medicare Wellness Visit    HEALTH RISK ASSESSMENT    1950    Recent Hospitalizations:  No hospitalization(s) within the last year..        Current Medical Providers:  Patient Care Team:  Julianna Howell MD as PCP - General  Alix Alexander MD as Consulting Physician (Orthopedic Surgery)  Jorge Chairez MD as Consulting Physician (Otolaryngology)  Roman Hazel MD as Consulting Physician (Urology)  Patricio Flores MD as Consulting Physician (Allergy)  Sai Granda MD as Consulting Physician (Gastroenterology)  Magdalena Christensen MD as Consulting Physician (Dermatology)  Luanne Levin MD as Consulting Physician (Cardiology)        Smoking Status:  History   Smoking Status   • Never Smoker   Smokeless Tobacco   • Never Used     Comment: caffeine use       Alcohol Consumption:  History   Alcohol Use   • Yes     Comment: rare       Depression Screen:   PHQ-2/PHQ-9 Depression Screening 11/2/2017   Little interest or pleasure in doing things 0   Feeling down, depressed, or hopeless 0   Trouble falling or staying asleep, or sleeping too much 0   Feeling tired or having little energy 0   Poor appetite or overeating 0   Feeling bad about yourself - or that you are a failure or have let yourself or your family down 0   Trouble concentrating on things, such as reading the newspaper or watching television 0   Moving or speaking so slowly that other people could have noticed. Or the opposite - being so fidgety or restless that you have been moving around a lot more than usual 0   Thoughts that you would be better off dead, or of hurting yourself in some way 0   Total Score 0       Health Habits and Functional and Cognitive Screening:  Functional & Cognitive Status 11/2/2017   Do you have difficulty preparing food and eating? No   Do you have difficulty bathing yourself, getting dressed or grooming yourself? No   Do you have  difficulty using the toilet? No   Do you have difficulty moving around from place to place? Yes   Do you have trouble with steps or getting out of a bed or a chair? Yes   In the past year have you fallen or experienced a near fall? No   Do you need help using the phone?  No   Are you deaf or do you have serious difficulty hearing?  No   Do you need help with transportation? No   Do you need help shopping? No   Do you need help preparing meals?  No   Do you need help with housework?  No   Do you need help with laundry? No   Do you need help taking your medications? No   Do you need help managing money? No   Do you have difficulty concentrating, remembering or making decisions? No           Does the patient have evidence of cognitive impairment? No    Asiprin use counseling: Does not need ASA (and currently is not on it)      Recent Lab Results:    Visual Acuity:  No exam data present    Age-appropriate Screening Schedule:  Refer to the list below for future screening recommendations based on patient's age, sex and/or medical conditions. Orders for these recommended tests are listed in the plan section. The patient has been provided with a written plan.    Health Maintenance   Topic Date Due   • DXA SCAN  11/02/2017   • PNEUMOCOCCAL VACCINES (65+ LOW/MEDIUM RISK) (2 of 2 - PPSV23) 01/16/2018   • LIPID PANEL  06/15/2018   • MAMMOGRAM  07/26/2019   • COLONOSCOPY  04/01/2023   • TDAP/TD VACCINES (2 - Td) 05/19/2025   • INFLUENZA VACCINE  Completed   • ZOSTER VACCINE  Completed        Subjective   History of Present Illness    Valery Zabala is a 67 y.o. female who presents for an Annual Wellness Visit.    The following portions of the patient's history were reviewed and updated as appropriate: allergies, current medications, past family history, past medical history, past social history, past surgical history and problem list.    Outpatient Medications Prior to Visit   Medication Sig Dispense Refill   • acyclovir  "(ZOVIRAX) 200 MG capsule TAKE 1 CAPSULE DAILY 90 capsule 2   • cholecalciferol (VITAMIN D3) 1000 UNITS tablet Take 2,000 Units by mouth daily.     • cycloSPORINE (RESTASIS) 0.05 % ophthalmic emulsion 1 drop 2 (two) times a day.     • docusate sodium (COLACE) 250 MG capsule Take 250 mg by mouth daily.     • doxepin (SINEquan) 25 MG capsule TAKE 1 CAPSULE DAILY 90 capsule 2   • fluticasone (FLONASE) 50 MCG/ACT nasal spray 2 sprays into each nostril daily. Administer 2 sprays in each nostril for each dose. 3 each 4   • Linaclotide (LINZESS) 145 MCG capsule Take 1 capsule by mouth Daily.     • Loratadine (CLARITIN) 10 MG capsule Take 1 capsule by mouth Daily.     • nebivolol (BYSTOLIC) 5 MG tablet Take 0.5 tablets by mouth Daily. 28 tablet 0   • ramipril (ALTACE) 10 MG capsule TAKE 1 CAPSULE DAILY 90 capsule 3   • ranitidine (ZANTAC) 150 MG tablet Take 150 mg by mouth 2 (two) times a day.       No facility-administered medications prior to visit.        Patient Active Problem List   Diagnosis   • Hypertension   • Hyperlipidemia   • Hx of colonic polyps   • Vitamin D deficiency   • Palpitations   • GERD (gastroesophageal reflux disease)   • IBS (irritable bowel syndrome)   • Insomnia   • Sleep apnea   • Slow transit constipation   • Multiple joint pain       Advance Care Planning:  has an advance directive - a copy has been provided and is in file    Identification of Risk Factors:  Risk factors include: weight , unhealthy diet, cardiovascular risk, inactivity and chronic pain.    Review of Systems    Compared to one year ago, the patient feels her physical health is the same.  Compared to one year ago, the patient feels her mental health is the same.    Objective     Physical Exam    Vitals:    11/02/17 0847   BP: 118/78   BP Location: Left arm   Patient Position: Sitting   Cuff Size: Adult   Pulse: 71   Resp: 17   Temp: 97.8 °F (36.6 °C)   TempSrc: Temporal Artery    SpO2: 98%   Weight: 232 lb (105 kg)   Height: 70.25\" " (178.4 cm)   PainSc: 0-No pain       Body mass index is 33.05 kg/(m^2).  Discussed the patient's BMI with her. The BMI is above average; BMI management plan is completed.    Assessment/Plan   Patient Self-Management and Personalized Health Advice  The patient has been provided with information about: diet, exercise, weight management, prevention of cardiac or vascular disease, the relationship between weight and GERD and fall prevention and preventive services including:   · Bone densitometry screening, Counseling for cardiovascular disease risk reduction, Diabetes screening, see lab orders, Exercise counseling provided, Fall Risk assessment done, Fall Risk plan of care done, Nutrition counseling provided, Screening for AAA, referral for ultrasound placed.    Visit Diagnoses:    ICD-10-CM ICD-9-CM   1. Essential hypertension I10 401.9   2. Slow transit constipation K59.01 564.01   3. Vitamin D deficiency E55.9 268.9   4. Obstructive sleep apnea syndrome G47.33 327.23   5. Multiple joint pain M25.50 719.49   6. Other hyperlipidemia E78.4 272.4   7. Hx of colonic polyps Z86.010 V12.72   8. Gastroesophageal reflux disease, esophagitis presence not specified K21.9 530.81   9. Irritable bowel syndrome with constipation K58.1 564.1   10. Primary insomnia F51.01 307.42   11. Palpitations R00.2 785.1   12. Menopause Z78.0 627.2       Orders Placed This Encounter   Procedures   • DEXA Bone Density Axial     Standing Status:   Future     Standing Expiration Date:   11/2/2018     Order Specific Question:   Reason for Exam:     Answer:   menopause       Outpatient Encounter Prescriptions as of 11/2/2017   Medication Sig Dispense Refill   • acyclovir (ZOVIRAX) 200 MG capsule TAKE 1 CAPSULE DAILY 90 capsule 2   • cholecalciferol (VITAMIN D3) 1000 UNITS tablet Take 2,000 Units by mouth daily.     • cycloSPORINE (RESTASIS) 0.05 % ophthalmic emulsion 1 drop 2 (two) times a day.     • docusate sodium (COLACE) 250 MG capsule Take 250  mg by mouth daily.     • doxepin (SINEquan) 25 MG capsule TAKE 1 CAPSULE DAILY 90 capsule 2   • fluticasone (FLONASE) 50 MCG/ACT nasal spray 2 sprays into each nostril daily. Administer 2 sprays in each nostril for each dose. 3 each 4   • Linaclotide (LINZESS) 145 MCG capsule Take 1 capsule by mouth Daily.     • Loratadine (CLARITIN) 10 MG capsule Take 1 capsule by mouth Daily.     • nebivolol (BYSTOLIC) 5 MG tablet Take 0.5 tablets by mouth Daily. 28 tablet 0   • ramipril (ALTACE) 10 MG capsule TAKE 1 CAPSULE DAILY 90 capsule 3   • ranitidine (ZANTAC) 150 MG tablet Take 150 mg by mouth 2 (two) times a day.       No facility-administered encounter medications on file as of 11/2/2017.        Reviewed use of high risk medication in the elderly: not applicable  Reviewed for potential of harmful drug interactions in the elderly: not applicable    Follow Up:  No Follow-up on file.     An After Visit Summary and PPPS with all of these plans were given to the patient.          Need daily strengthening & balance exercises (shown today).   She had screening for AAA & carotid disease 2017.  Need repeat every 1-2 yrs.  Information given today.

## 2017-11-02 NOTE — PROGRESS NOTES
"Subjective   Valery Zabala is a 67 y.o. female here for   Chief Complaint   Patient presents with   • Initial Wellness Visit   • Hyperlipidemia   • Hypertension   .    Vitals:    11/02/17 0847   BP: 118/78   BP Location: Left arm   Patient Position: Sitting   Cuff Size: Adult   Pulse: 71   Resp: 17   Temp: 97.8 °F (36.6 °C)   TempSrc: Temporal Artery    SpO2: 98%   Weight: 232 lb (105 kg)   Height: 70.25\" (178.4 cm)       Body mass index is 33.05 kg/(m^2).    Hyperlipidemia   This is a chronic problem. The current episode started more than 1 year ago. The problem is controlled. Recent lipid tests were reviewed and are normal. She has no history of chronic renal disease, diabetes, hypothyroidism or liver disease. Pertinent negatives include no chest pain or shortness of breath.   Hypertension   This is a chronic problem. The current episode started more than 1 year ago. The problem is unchanged. The problem is controlled. Associated symptoms include peripheral edema. Pertinent negatives include no anxiety, chest pain, malaise/fatigue, neck pain, palpitations or shortness of breath. There is no history of chronic renal disease.        The following portions of the patient's history were reviewed and updated as appropriate: allergies, current medications, past social history and problem list.    Review of Systems   Constitutional: Negative for chills, fatigue, fever and malaise/fatigue.   HENT: Positive for postnasal drip.    Respiratory: Positive for cough (resolving bronchitis). Negative for shortness of breath and wheezing.    Cardiovascular: Positive for leg swelling (since taking bystolic). Negative for chest pain and palpitations.   Musculoskeletal: Positive for arthralgias (knees & hands). Negative for back pain and neck pain.   Allergic/Immunologic: Positive for environmental allergies. Negative for food allergies.   Psychiatric/Behavioral: Negative for dysphoric mood and sleep disturbance. The patient is not " nervous/anxious.        Objective   Physical Exam   Constitutional: She appears well-developed and well-nourished. No distress.   Cardiovascular: Normal rate, regular rhythm and normal heart sounds.    Pulmonary/Chest: No respiratory distress. She has no wheezes. She has no rales. She exhibits no tenderness.   Musculoskeletal: She exhibits no edema.   Psychiatric: She has a normal mood and affect. Her behavior is normal.   Nursing note and vitals reviewed.      Assessment/Plan   Diagnoses and all orders for this visit:    Essential hypertension  Comments:  stable - call if bp over 140/90  Orders:  -     CBC Auto Differential; Future  -     Comprehensive Metabolic Panel; Future  -     Lipid Panel; Future  -     TSH; Future  -     Urinalysis With / Microscopic If Indicated - Urine, Clean Catch; Future    Slow transit constipation  Comments:  ok with linzess  Orders:  -     TSH; Future    Vitamin D deficiency  Comments:  D=23 - continue 2,000 units vit d daily    Obstructive sleep apnea syndrome  Comments:  uses CPAP nightly with no problems    Multiple joint pain  Comments:  knee injections per dr machuca, hand injections in past    Other hyperlipidemia  Comments:  need diet & exercise    Hx of colonic polyps  Comments:  need routine c-scope    Gastroesophageal reflux disease, esophagitis presence not specified  Comments:  ok with zantac    Irritable bowel syndrome with constipation  Comments:  ok with linzess    Primary insomnia  Comments:  ok with doxepin    Palpitations  Comments:  off & on - no change - call if worsening  Orders:  -     TSH; Future    Menopause  -     DEXA Bone Density Axial; Future    Initial Medicare annual wellness visit       No pelvic, pap or rectal b/c total hyst 1985 (no cancer) & c-scope in 4 mos.   She states she is comfortable with her own breast exam & does not need it today.      prevnar 13 vaccine at [facility] 1/2017 - to get pneu 23 in 1/2018      Wellness today.   Need daily  strengthening & balance exercises (shown today).   Need screening for AAA & carotid disease.  Information given today.

## 2017-11-21 ENCOUNTER — OFFICE VISIT (OUTPATIENT)
Dept: CARDIOLOGY | Facility: CLINIC | Age: 67
End: 2017-11-21

## 2017-11-21 VITALS
HEART RATE: 77 BPM | HEIGHT: 71 IN | WEIGHT: 228 LBS | SYSTOLIC BLOOD PRESSURE: 120 MMHG | BODY MASS INDEX: 31.92 KG/M2 | DIASTOLIC BLOOD PRESSURE: 80 MMHG

## 2017-11-21 DIAGNOSIS — E78.49 OTHER HYPERLIPIDEMIA: ICD-10-CM

## 2017-11-21 DIAGNOSIS — I10 ESSENTIAL HYPERTENSION: Primary | ICD-10-CM

## 2017-11-21 DIAGNOSIS — R00.2 PALPITATIONS: ICD-10-CM

## 2017-11-21 PROCEDURE — 99214 OFFICE O/P EST MOD 30 MIN: CPT | Performed by: INTERNAL MEDICINE

## 2017-11-21 PROCEDURE — 93000 ELECTROCARDIOGRAM COMPLETE: CPT | Performed by: INTERNAL MEDICINE

## 2017-11-21 NOTE — PROGRESS NOTES
Date of Office Visit: 2017  Encounter Provider: Luanne Levin MD  Place of Service: [facility] CARDIOLOGY  Patient Name: Valery Zabala  :1950      Patient ID:  Valery Zabala is a 67 y.o. female is here for  followup for hypertension and hyperlipidemia.         History of Present Illness    She came in for chest pain.  She had a nonischemic stress nuclear perfusion study and normal echocardiogram done 2017    Her last lipid panel was in 2017 showing H show 53 and .  Her CMP at that time was normal.     She has obstructive sleep apnea and uses a BiPAP for that.  She's had a history of rheumatic fever.  She has no diabetes nails.  SNOMED myocardial infarction.  She has irritable bowel syndrome and gastroscope reflux disease.  She will of Protonix because of long-term issues with osteoporosis.  She takes Linzess for the irritable bowel syndrome.  She was taking Zantac but had some vomiting with this.  When she lost weight her GERD got better and the vomiting got better.        She does have constant knee pain for which she gets steroid injections because of the severe valgus abnormality.  She has rare alcohol and does not smoke.  She is  has 2 stepchildren.     Her father had coronary bypass grafting.  Her mother had arrhythmia.  Her maternal grandfather  myocardial infarction in his 70s.     She vascular screening done 2017 and this did show mild bilateral carotid disease.  Her abdominal aorta was normal and her peripheral vasculature was normal.     She overall feels well.  She notices her blood pressure seems to rise a bit in the afternoon up to the 130s.  She takes both for blood pressure medications at night.  She's had no further chest pain.  She has no palpitations, tachycardia, dizziness or syncope.  She has no dyspnea with activity.    Past Medical History:   Diagnosis Date   • Chest pain    • Chest pain at rest    •  Essential hypertension    • Fatigue    • GERD (gastroesophageal reflux disease)    • History of anemia    • Hx of colonic polyps    • Hyperlipidemia    • Hypertension    • IBS (irritable bowel syndrome)    • Insomnia    • Irritable bowel syndrome with constipation    • Multiple joint pain    • MARANDA (obstructive sleep apnea)    • Palpitations    • Primary insomnia    • Rheumatic fever    • Sleep apnea     c pap   • Sleep disturbance    • Vitamin D deficiency          Past Surgical History:   Procedure Laterality Date   • APPENDECTOMY     • ARTERIOVENOUS FISTULA REPAIR  1986   • EYE SURGERY     • HYSTERECTOMY  1994   • SHOULDER SURGERY  1997   • TONSILLECTOMY         Current Outpatient Prescriptions on File Prior to Visit   Medication Sig Dispense Refill   • acyclovir (ZOVIRAX) 200 MG capsule TAKE 1 CAPSULE DAILY 90 capsule 2   • carisoprodol (SOMA) 350 MG tablet Take 1 tablet by mouth 3 (Three) Times a Day As Needed for Muscle Spasms. 12 tablet 0   • cholecalciferol (VITAMIN D3) 1000 UNITS tablet Take 2,000 Units by mouth daily.     • cycloSPORINE (RESTASIS) 0.05 % ophthalmic emulsion 1 drop 2 (two) times a day.     • docusate sodium (COLACE) 250 MG capsule Take 250 mg by mouth daily.     • doxepin (SINEquan) 25 MG capsule TAKE 1 CAPSULE DAILY 90 capsule 2   • fluticasone (FLONASE) 50 MCG/ACT nasal spray 2 sprays into each nostril daily. Administer 2 sprays in each nostril for each dose. (Patient taking differently: 2 sprays into each nostril As Needed. Administer 2 sprays in each nostril for each dose. ) 3 each 4   • Linaclotide (LINZESS) 145 MCG capsule Take 1 capsule by mouth Daily.     • Loratadine (CLARITIN) 10 MG capsule Take 1 capsule by mouth Daily.     • nebivolol (BYSTOLIC) 5 MG tablet Take 0.5 tablets by mouth Daily. 28 tablet 0   • ramipril (ALTACE) 10 MG capsule TAKE 1 CAPSULE DAILY 90 capsule 3   • ranitidine (ZANTAC) 150 MG tablet Take 150 mg by mouth 2 (two) times a day.       No current  "facility-administered medications on file prior to visit.        Social History     Social History   • Marital status:      Spouse name: N/A   • Number of children: N/A   • Years of education: N/A     Occupational History   • Not on file.     Social History Main Topics   • Smoking status: Never Smoker   • Smokeless tobacco: Never Used      Comment: caffeine use   • Alcohol use Yes      Comment: rare   • Drug use: No   • Sexual activity: No     Other Topics Concern   • Not on file     Social History Narrative           Review of Systems   Constitution: Negative.   HENT: Negative for congestion.    Eyes: Negative for vision loss in left eye and vision loss in right eye.   Respiratory: Negative.  Negative for cough, hemoptysis, shortness of breath, sleep disturbances due to breathing, snoring, sputum production and wheezing.    Endocrine: Negative.    Hematologic/Lymphatic: Negative.    Skin: Negative for poor wound healing and rash.   Musculoskeletal: Negative for falls, gout, muscle cramps and myalgias.   Gastrointestinal: Negative for abdominal pain, diarrhea, dysphagia, hematemesis, melena, nausea and vomiting.   Neurological: Negative for excessive daytime sleepiness, dizziness, headaches, light-headedness, loss of balance, seizures and vertigo.   Psychiatric/Behavioral: Negative for depression and substance abuse. The patient is not nervous/anxious.        Procedures    ECG 12 Lead  Date/Time: 11/21/2017 9:20 AM  Performed by: AFRICA COBB  Authorized by: AFRICA COBB   Comparison: compared with previous ECG   Similar to previous ECG  Rhythm: sinus rhythm  Clinical impression: normal ECG               Objective:      Vitals:    11/21/17 0856   BP: 120/80   Pulse: 77   Weight: 228 lb (103 kg)   Height: 71\" (180.3 cm)     Body mass index is 31.8 kg/(m^2).    Physical Exam   Constitutional: She is oriented to person, place, and time. She appears well-developed and well-nourished. No distress. "   HENT:   Head: Normocephalic and atraumatic.   Eyes: Conjunctivae are normal. No scleral icterus.   Neck: Neck supple. No JVD present. Carotid bruit is not present. No thyromegaly present.   Cardiovascular: Normal rate, regular rhythm, S1 normal, S2 normal, normal heart sounds and intact distal pulses.   No extrasystoles are present. PMI is not displaced.  Exam reveals no gallop.    No murmur heard.  Pulses:       Carotid pulses are 2+ on the right side, and 2+ on the left side.       Radial pulses are 2+ on the right side, and 2+ on the left side.        Dorsalis pedis pulses are 2+ on the right side, and 2+ on the left side.        Posterior tibial pulses are 2+ on the right side, and 2+ on the left side.   Pulmonary/Chest: Effort normal and breath sounds normal. No respiratory distress. She has no wheezes. She has no rhonchi. She has no rales. She exhibits no tenderness.   Abdominal: Soft. Bowel sounds are normal. She exhibits no distension, no abdominal bruit and no mass. There is no tenderness.   Musculoskeletal: She exhibits no edema or deformity.   Lymphadenopathy:     She has no cervical adenopathy.   Neurological: She is alert and oriented to person, place, and time. No cranial nerve deficit.   Skin: Skin is warm and dry. No rash noted. She is not diaphoretic. No cyanosis. No pallor. Nails show no clubbing.   Psychiatric: She has a normal mood and affect. Judgment normal.   Vitals reviewed.      Lab Review:       Assessment:      Diagnosis Plan   1. Essential hypertension     2. Other hyperlipidemia     3. Palpitations       1. Chest pain, normal stress nuclear study 8/2017.  No further pain.   2. Hypertension, well controlled.    3. Hyperlipidemia, untreated.   4. Normal echo 8/2017.  5. Bilateral carotid bruits     Plan:       See NP in 6 months.

## 2017-11-27 RX ORDER — NEBIVOLOL 5 MG/1
2.5 TABLET ORAL DAILY
Qty: 45 TABLET | Refills: 2 | Status: SHIPPED | OUTPATIENT
Start: 2017-11-27 | End: 2018-09-25 | Stop reason: SDUPTHER

## 2017-12-08 ENCOUNTER — TELEPHONE (OUTPATIENT)
Dept: CARDIOLOGY | Facility: CLINIC | Age: 67
End: 2017-12-08

## 2018-04-24 ENCOUNTER — CLINICAL SUPPORT (OUTPATIENT)
Dept: INTERNAL MEDICINE | Facility: CLINIC | Age: 68
End: 2018-04-24

## 2018-04-24 ENCOUNTER — LAB (OUTPATIENT)
Dept: INTERNAL MEDICINE | Facility: CLINIC | Age: 68
End: 2018-04-24

## 2018-04-24 DIAGNOSIS — I10 ESSENTIAL HYPERTENSION: ICD-10-CM

## 2018-04-24 DIAGNOSIS — K59.01 SLOW TRANSIT CONSTIPATION: ICD-10-CM

## 2018-04-24 DIAGNOSIS — Z78.0 MENOPAUSE: ICD-10-CM

## 2018-04-24 DIAGNOSIS — R00.2 PALPITATIONS: ICD-10-CM

## 2018-04-24 LAB
ALBUMIN SERPL-MCNC: 3.8 G/DL (ref 3.5–5.2)
ALBUMIN/GLOB SERPL: 1.3 G/DL
ALP SERPL-CCNC: 68 U/L (ref 39–117)
ALT SERPL W P-5'-P-CCNC: 24 U/L (ref 1–33)
ANION GAP SERPL CALCULATED.3IONS-SCNC: 13.5 MMOL/L
AST SERPL-CCNC: 25 U/L (ref 1–32)
BACTERIA UR QL AUTO: ABNORMAL /HPF
BASOPHILS # BLD AUTO: 0.01 10*3/MM3 (ref 0–0.2)
BASOPHILS NFR BLD AUTO: 0.2 % (ref 0–2)
BILIRUB SERPL-MCNC: 0.3 MG/DL (ref 0.1–1.2)
BILIRUB UR QL STRIP: NEGATIVE
BUN BLD-MCNC: 23 MG/DL (ref 8–23)
BUN/CREAT SERPL: 23.5 (ref 7–25)
CALCIUM SPEC-SCNC: 9.1 MG/DL (ref 8.6–10.5)
CHLORIDE SERPL-SCNC: 103 MMOL/L (ref 98–107)
CHOLEST SERPL-MCNC: 158 MG/DL (ref 0–200)
CLARITY UR: CLEAR
CO2 SERPL-SCNC: 25.5 MMOL/L (ref 22–29)
COLOR UR: YELLOW
CREAT BLD-MCNC: 0.98 MG/DL (ref 0.57–1)
DEPRECATED RDW RBC AUTO: 47.7 FL (ref 37–54)
EOSINOPHIL # BLD AUTO: 0.05 10*3/MM3 (ref 0–0.7)
EOSINOPHIL NFR BLD AUTO: 1 % (ref 0–5)
ERYTHROCYTE [DISTWIDTH] IN BLOOD BY AUTOMATED COUNT: 14.5 % (ref 11.5–15)
GFR SERPL CREATININE-BSD FRML MDRD: 57 ML/MIN/1.73
GLOBULIN UR ELPH-MCNC: 3 GM/DL
GLUCOSE BLD-MCNC: 91 MG/DL (ref 65–99)
GLUCOSE UR STRIP-MCNC: NEGATIVE MG/DL
HCT VFR BLD AUTO: 35 % (ref 34.1–44.9)
HDLC SERPL-MCNC: 37 MG/DL (ref 40–60)
HGB BLD-MCNC: 11.2 G/DL (ref 11.2–15.7)
HGB UR QL STRIP.AUTO: ABNORMAL
HYALINE CASTS UR QL AUTO: ABNORMAL /LPF
KETONES UR QL STRIP: NEGATIVE
LDLC SERPL CALC-MCNC: 100 MG/DL (ref 0–100)
LDLC/HDLC SERPL: 2.71 {RATIO}
LEUKOCYTE ESTERASE UR QL STRIP.AUTO: NEGATIVE
LYMPHOCYTES # BLD AUTO: 1.94 10*3/MM3 (ref 0.8–7)
LYMPHOCYTES NFR BLD AUTO: 37 % (ref 10–60)
MCH RBC QN AUTO: 30 PG (ref 26–34)
MCHC RBC AUTO-ENTMCNC: 32 G/DL (ref 31–37)
MCV RBC AUTO: 93.8 FL (ref 80–100)
MONOCYTES # BLD AUTO: 0.4 10*3/MM3 (ref 0–1)
MONOCYTES NFR BLD AUTO: 7.6 % (ref 0–13)
MUCOUS THREADS URNS QL MICRO: ABNORMAL /HPF
NEUTROPHILS # BLD AUTO: 2.85 10*3/MM3 (ref 1–11)
NEUTROPHILS NFR BLD AUTO: 54.2 % (ref 30–85)
NITRITE UR QL STRIP: NEGATIVE
PH UR STRIP.AUTO: 5.5 [PH] (ref 5–8)
PLATELET # BLD AUTO: 249 10*3/MM3 (ref 150–450)
PMV BLD AUTO: 10.3 FL (ref 6–12)
POTASSIUM BLD-SCNC: 4.4 MMOL/L (ref 3.5–5.2)
PROT SERPL-MCNC: 6.8 G/DL (ref 6–8.5)
PROT UR QL STRIP: NEGATIVE
RBC # BLD AUTO: 3.73 10*6/MM3 (ref 3.93–5.22)
RBC # UR: ABNORMAL /HPF
REF LAB TEST METHOD: ABNORMAL
SODIUM BLD-SCNC: 142 MMOL/L (ref 136–145)
SP GR UR STRIP: 1.01 (ref 1–1.03)
SQUAMOUS #/AREA URNS HPF: ABNORMAL /HPF
TRIGL SERPL-MCNC: 104 MG/DL (ref 0–150)
TSH SERPL-ACNC: 3.58 MIU/ML (ref 0.27–4.2)
UROBILINOGEN UR QL STRIP: ABNORMAL
VLDLC SERPL-MCNC: 20.8 MG/DL (ref 5–40)
WBC NRBC COR # BLD: 5.25 10*3/MM3 (ref 5–10)
WBC UR QL AUTO: ABNORMAL /HPF

## 2018-04-24 PROCEDURE — 81001 URINALYSIS AUTO W/SCOPE: CPT | Performed by: INTERNAL MEDICINE

## 2018-04-24 PROCEDURE — 85025 COMPLETE CBC W/AUTO DIFF WBC: CPT | Performed by: INTERNAL MEDICINE

## 2018-04-24 PROCEDURE — 77080 DXA BONE DENSITY AXIAL: CPT | Performed by: INTERNAL MEDICINE

## 2018-04-24 PROCEDURE — 80061 LIPID PANEL: CPT | Performed by: INTERNAL MEDICINE

## 2018-04-24 PROCEDURE — 80053 COMPREHEN METABOLIC PANEL: CPT | Performed by: INTERNAL MEDICINE

## 2018-04-30 RX ORDER — ACYCLOVIR 200 MG/1
CAPSULE ORAL
Qty: 90 CAPSULE | Refills: 2 | Status: SHIPPED | OUTPATIENT
Start: 2018-04-30 | End: 2019-04-17 | Stop reason: SDUPTHER

## 2018-04-30 RX ORDER — DOXEPIN HYDROCHLORIDE 25 MG/1
CAPSULE ORAL
Qty: 90 CAPSULE | Refills: 2 | Status: SHIPPED | OUTPATIENT
Start: 2018-04-30 | End: 2019-04-17 | Stop reason: SDUPTHER

## 2018-05-10 ENCOUNTER — OFFICE VISIT (OUTPATIENT)
Dept: INTERNAL MEDICINE | Facility: CLINIC | Age: 68
End: 2018-05-10

## 2018-05-10 VITALS
BODY MASS INDEX: 33.21 KG/M2 | SYSTOLIC BLOOD PRESSURE: 122 MMHG | HEART RATE: 82 BPM | WEIGHT: 232 LBS | OXYGEN SATURATION: 98 % | HEIGHT: 70 IN | DIASTOLIC BLOOD PRESSURE: 80 MMHG

## 2018-05-10 DIAGNOSIS — R53.82 CHRONIC FATIGUE: ICD-10-CM

## 2018-05-10 DIAGNOSIS — J30.2 OTHER SEASONAL ALLERGIC RHINITIS: ICD-10-CM

## 2018-05-10 DIAGNOSIS — I10 ESSENTIAL HYPERTENSION: Primary | ICD-10-CM

## 2018-05-10 DIAGNOSIS — M54.2 NECK PAIN: ICD-10-CM

## 2018-05-10 DIAGNOSIS — Z86.010 HX OF COLONIC POLYPS: ICD-10-CM

## 2018-05-10 DIAGNOSIS — E78.49 OTHER HYPERLIPIDEMIA: ICD-10-CM

## 2018-05-10 DIAGNOSIS — F51.01 PRIMARY INSOMNIA: ICD-10-CM

## 2018-05-10 DIAGNOSIS — K21.9 GASTROESOPHAGEAL REFLUX DISEASE, ESOPHAGITIS PRESENCE NOT SPECIFIED: ICD-10-CM

## 2018-05-10 PROCEDURE — 99214 OFFICE O/P EST MOD 30 MIN: CPT | Performed by: INTERNAL MEDICINE

## 2018-05-10 RX ORDER — FLUTICASONE PROPIONATE 50 MCG
2 SPRAY, SUSPENSION (ML) NASAL DAILY
Qty: 1 BOTTLE | Refills: 12 | Status: SHIPPED | OUTPATIENT
Start: 2018-05-10 | End: 2019-06-19 | Stop reason: SDUPTHER

## 2018-05-10 RX ORDER — FLUTICASONE PROPIONATE 50 MCG
2 SPRAY, SUSPENSION (ML) NASAL DAILY
Qty: 3 G | Refills: 2 | Status: SHIPPED | OUTPATIENT
Start: 2018-05-10 | End: 2018-05-10 | Stop reason: SDUPTHER

## 2018-05-10 NOTE — PROGRESS NOTES
"Subjective   Valery Zabala is a 67 y.o. female here for   Chief Complaint   Patient presents with   • Hyperlipidemia   • Hypertension   • Insomnia   • Heartburn   .    Vitals:    05/10/18 1244   BP: 122/80   BP Location: Right arm   Patient Position: Sitting   Cuff Size: Adult   Pulse: 82   SpO2: 98%   Weight: 105 kg (232 lb)   Height: 178.4 cm (70.25\")       Body mass index is 33.05 kg/m².    Hyperlipidemia   This is a chronic problem. The current episode started more than 1 year ago. The problem is controlled. Recent lipid tests were reviewed and are normal. She has no history of diabetes. Pertinent negatives include no chest pain or shortness of breath.   Hypertension   This is a chronic problem. The current episode started more than 1 year ago. The problem is unchanged. The problem is controlled. Associated symptoms include headaches (since taking bystolic) and palpitations (occ). Pertinent negatives include no chest pain or shortness of breath.   Insomnia   This is a chronic problem. The current episode started more than 1 year ago. The problem has been resolved. Associated symptoms include fatigue and headaches (since taking bystolic). Pertinent negatives include no chest pain, chills, coughing or fever. Abdominal pain: heartburn is worse.        The following portions of the patient's history were reviewed and updated as appropriate: allergies, current medications, past social history and problem list.    Review of Systems   Constitutional: Positive for fatigue. Negative for chills and fever.   Respiratory: Negative for cough, shortness of breath and wheezing.    Cardiovascular: Positive for palpitations (occ) and leg swelling. Negative for chest pain.   Gastrointestinal: Abdominal pain: heartburn is worse.   Musculoskeletal: Positive for neck stiffness.   Neurological: Positive for headaches (since taking bystolic).   Psychiatric/Behavioral: Positive for sleep disturbance. Negative for dysphoric mood. The " patient has insomnia. The patient is not nervous/anxious.        Objective   Physical Exam   Constitutional: She appears well-developed and well-nourished. No distress.   Cardiovascular: Normal rate, regular rhythm and normal heart sounds.    Pulmonary/Chest: No respiratory distress. She has no wheezes. She has no rales. She exhibits no tenderness.   Musculoskeletal: She exhibits edema (trace bilat LE).   Neurological: She is alert. No cranial nerve deficit. She exhibits abnormal muscle tone (tight paracervical muscles bilat). Coordination normal.   Psychiatric: She has a normal mood and affect. Her behavior is normal.   Nursing note and vitals reviewed.      Assessment/Plan   Diagnoses and all orders for this visit:    Essential hypertension  Comments:  stable - call if bp over 140/90    Other hyperlipidemia  Comments:  need diet/ex    Other seasonal allergic rhinitis  Comments:  ok with flonase  Orders:  -     fluticasone (FLONASE) 50 MCG/ACT nasal spray; 2 sprays into each nostril Daily. Administer 2 sprays in each nostril for each dose.    Gastroesophageal reflux disease, esophagitis presence not specified  Comments:  worse (b/c diet?) - restart healthy diet & daily exercise - need EGD next wk with c-scope    Hx of colonic polyps  Comments:  c-scope in 6 days (dr zhang)    Chronic fatigue  Comments:  mild fatigue & borderline TSH - rechk 1 mo  Orders:  -     TSH Rfx On Abnormal To Free T4; Future    Primary insomnia  Comments:  resolved with nightly doxepin    Neck pain  Comments:  & headache since taking bystolic - discuss with cardiol next wk    Other orders  -     Biotin 5 MG tablet dispersible; Take  by mouth Daily.

## 2018-05-15 VITALS
RESPIRATION RATE: 14 BRPM | DIASTOLIC BLOOD PRESSURE: 83 MMHG | DIASTOLIC BLOOD PRESSURE: 64 MMHG | HEART RATE: 77 BPM | DIASTOLIC BLOOD PRESSURE: 72 MMHG | SYSTOLIC BLOOD PRESSURE: 113 MMHG | SYSTOLIC BLOOD PRESSURE: 98 MMHG | HEART RATE: 89 BPM | SYSTOLIC BLOOD PRESSURE: 112 MMHG | SYSTOLIC BLOOD PRESSURE: 148 MMHG | HEART RATE: 74 BPM | OXYGEN SATURATION: 95 % | SYSTOLIC BLOOD PRESSURE: 104 MMHG | TEMPERATURE: 96.6 F | DIASTOLIC BLOOD PRESSURE: 61 MMHG | HEART RATE: 79 BPM | RESPIRATION RATE: 12 BRPM | HEART RATE: 92 BPM | SYSTOLIC BLOOD PRESSURE: 144 MMHG | TEMPERATURE: 98.9 F | RESPIRATION RATE: 19 BRPM | HEART RATE: 73 BPM | DIASTOLIC BLOOD PRESSURE: 62 MMHG | WEIGHT: 230 LBS | OXYGEN SATURATION: 98 % | DIASTOLIC BLOOD PRESSURE: 69 MMHG | RESPIRATION RATE: 9 BRPM | OXYGEN SATURATION: 97 % | DIASTOLIC BLOOD PRESSURE: 55 MMHG | HEART RATE: 81 BPM | DIASTOLIC BLOOD PRESSURE: 45 MMHG | SYSTOLIC BLOOD PRESSURE: 119 MMHG | HEART RATE: 78 BPM | RESPIRATION RATE: 13 BRPM | SYSTOLIC BLOOD PRESSURE: 142 MMHG | DIASTOLIC BLOOD PRESSURE: 71 MMHG | SYSTOLIC BLOOD PRESSURE: 110 MMHG | SYSTOLIC BLOOD PRESSURE: 103 MMHG | HEART RATE: 70 BPM | HEART RATE: 96 BPM | OXYGEN SATURATION: 96 % | DIASTOLIC BLOOD PRESSURE: 60 MMHG | SYSTOLIC BLOOD PRESSURE: 115 MMHG | RESPIRATION RATE: 27 BRPM | HEIGHT: 72 IN | DIASTOLIC BLOOD PRESSURE: 107 MMHG | DIASTOLIC BLOOD PRESSURE: 73 MMHG | OXYGEN SATURATION: 100 % | OXYGEN SATURATION: 90 % | RESPIRATION RATE: 16 BRPM | HEART RATE: 76 BPM | RESPIRATION RATE: 20 BRPM | SYSTOLIC BLOOD PRESSURE: 118 MMHG | OXYGEN SATURATION: 94 % | SYSTOLIC BLOOD PRESSURE: 128 MMHG | HEART RATE: 75 BPM | RESPIRATION RATE: 18 BRPM

## 2018-05-16 ENCOUNTER — AMBULATORY SURGICAL CENTER (AMBULATORY)
Dept: URBAN - METROPOLITAN AREA SURGERY 17 | Facility: SURGERY | Age: 68
End: 2018-05-16

## 2018-05-16 ENCOUNTER — OFFICE (AMBULATORY)
Dept: URBAN - METROPOLITAN AREA PATHOLOGY 4 | Facility: PATHOLOGY | Age: 68
End: 2018-05-16

## 2018-05-16 DIAGNOSIS — K44.9 DIAPHRAGMATIC HERNIA WITHOUT OBSTRUCTION OR GANGRENE: ICD-10-CM

## 2018-05-16 DIAGNOSIS — D12.3 BENIGN NEOPLASM OF TRANSVERSE COLON: ICD-10-CM

## 2018-05-16 DIAGNOSIS — D12.0 BENIGN NEOPLASM OF CECUM: ICD-10-CM

## 2018-05-16 DIAGNOSIS — K29.50 UNSPECIFIED CHRONIC GASTRITIS WITHOUT BLEEDING: ICD-10-CM

## 2018-05-16 DIAGNOSIS — K21.9 GASTRO-ESOPHAGEAL REFLUX DISEASE WITHOUT ESOPHAGITIS: ICD-10-CM

## 2018-05-16 DIAGNOSIS — K57.30 DIVERTICULOSIS OF LARGE INTESTINE WITHOUT PERFORATION OR ABS: ICD-10-CM

## 2018-05-16 DIAGNOSIS — Z86.010 PERSONAL HISTORY OF COLONIC POLYPS: ICD-10-CM

## 2018-05-16 DIAGNOSIS — K64.8 OTHER HEMORRHOIDS: ICD-10-CM

## 2018-05-16 LAB
GI HISTOLOGY: A. UNSPECIFIED: (no result)
GI HISTOLOGY: B. UNSPECIFIED: (no result)
GI HISTOLOGY: C. UNSPECIFIED: (no result)
GI HISTOLOGY: PDF REPORT: (no result)

## 2018-05-16 PROCEDURE — 45385 COLONOSCOPY W/LESION REMOVAL: CPT | Mod: PT | Performed by: INTERNAL MEDICINE

## 2018-05-16 PROCEDURE — 43239 EGD BIOPSY SINGLE/MULTIPLE: CPT | Mod: 59 | Performed by: INTERNAL MEDICINE

## 2018-05-16 PROCEDURE — 88305 TISSUE EXAM BY PATHOLOGIST: CPT | Mod: PT | Performed by: INTERNAL MEDICINE

## 2018-05-16 RX ADMIN — PROPOFOL 50 MG: 10 INJECTION, EMULSION INTRAVENOUS at 08:37

## 2018-05-16 RX ADMIN — LIDOCAINE HYDROCHLORIDE 25 MG: 10 INJECTION, SOLUTION EPIDURAL; INFILTRATION; INTRACAUDAL; PERINEURAL at 08:18

## 2018-05-16 RX ADMIN — PROPOFOL 100 MG: 10 INJECTION, EMULSION INTRAVENOUS at 08:18

## 2018-05-16 RX ADMIN — PROPOFOL 50 MG: 10 INJECTION, EMULSION INTRAVENOUS at 08:45

## 2018-05-16 RX ADMIN — PROPOFOL 50 MG: 10 INJECTION, EMULSION INTRAVENOUS at 08:24

## 2018-05-16 RX ADMIN — PROPOFOL 50 MG: 10 INJECTION, EMULSION INTRAVENOUS at 08:21

## 2018-05-16 RX ADMIN — PROPOFOL 50 MG: 10 INJECTION, EMULSION INTRAVENOUS at 08:40

## 2018-05-16 RX ADMIN — PROPOFOL 50 MG: 10 INJECTION, EMULSION INTRAVENOUS at 08:30

## 2018-05-16 RX ADMIN — PROPOFOL 50 MG: 10 INJECTION, EMULSION INTRAVENOUS at 08:44

## 2018-05-16 NOTE — SERVICEHPINOTES
68 yo WF with a hx. of polyps , she is due for a follow yup colonoscopy. She also have reflux, she has also been having N/V in spite of taking Zantac..

## 2018-05-21 ENCOUNTER — OFFICE VISIT (OUTPATIENT)
Dept: CARDIOLOGY | Facility: CLINIC | Age: 68
End: 2018-05-21

## 2018-05-21 VITALS
HEIGHT: 71 IN | SYSTOLIC BLOOD PRESSURE: 128 MMHG | WEIGHT: 234.8 LBS | DIASTOLIC BLOOD PRESSURE: 80 MMHG | BODY MASS INDEX: 32.87 KG/M2 | HEART RATE: 78 BPM

## 2018-05-21 DIAGNOSIS — G89.29 CHRONIC NONINTRACTABLE HEADACHE, UNSPECIFIED HEADACHE TYPE: ICD-10-CM

## 2018-05-21 DIAGNOSIS — G47.33 OBSTRUCTIVE SLEEP APNEA SYNDROME: ICD-10-CM

## 2018-05-21 DIAGNOSIS — R60.0 LOCALIZED EDEMA: ICD-10-CM

## 2018-05-21 DIAGNOSIS — R09.89 BILATERAL CAROTID BRUITS: ICD-10-CM

## 2018-05-21 DIAGNOSIS — I10 ESSENTIAL HYPERTENSION: Primary | ICD-10-CM

## 2018-05-21 DIAGNOSIS — R51.9 CHRONIC NONINTRACTABLE HEADACHE, UNSPECIFIED HEADACHE TYPE: ICD-10-CM

## 2018-05-21 DIAGNOSIS — R00.2 PALPITATIONS: ICD-10-CM

## 2018-05-21 DIAGNOSIS — I65.23 ATHEROSCLEROSIS OF BOTH CAROTID ARTERIES: ICD-10-CM

## 2018-05-21 PROCEDURE — 99214 OFFICE O/P EST MOD 30 MIN: CPT | Performed by: NURSE PRACTITIONER

## 2018-05-21 PROCEDURE — 93000 ELECTROCARDIOGRAM COMPLETE: CPT | Performed by: NURSE PRACTITIONER

## 2018-05-21 RX ORDER — NAPROXEN SODIUM 220 MG
220 TABLET ORAL 2 TIMES DAILY WITH MEALS
COMMUNITY

## 2018-05-21 NOTE — PROGRESS NOTES
"  Date of Office Visit: 2018  Encounter Provider: CARLIE Levy  Place of Service: [facility] CARDIOLOG[facility]  Patient Name: Valery Zabala  :1950    Chief Complaint   Patient presents with   • Follow-up   :     HPI: Valery Zabala is a 67 y.o. female who presents today for cardiac follow-up.  She is a new patient to me and her previous records have been reviewed.  She has a history of hypertension, GERD, colon polyps, hyperlipidemia, irritable bowel syndrome, rheumatic fever, and sleep apnea (compliant with BiPAP machine).  She is an established patient of Dr. Luanne Levin and was last in the office in 2017.  Upon review of her records she had a vascular screening in 2017 which showed mild bilateral carotid disease, normal abdominal aorta, and lower extremity GERALDO testing normal.  She's had a normal nuclear stress test and echocardiogram in 2017.  She's had palpitations all of her life which she describes a fluttering sensation for a few seconds.    In 2017, she was started on Bystolic 2.5 mg daily.  She states since then she has \"felt lousy\".  She has had headaches, neck spasms, and lower extremity edema which she feels is all attributed to the beta blocker.  She continues to experience the palpitations which last about 20 seconds associated with fluttering and lightheaded feeling.  Her lower extremity edema has worsened and she has occasional dizziness.  She denies chest pain, shortness of air, PND, orthopnea, cough, or syncope.  She has been checking her blood pressures at home which averages less than 120 over 60s.    The following portion of the patient's history were reviewed and updated as appropriate: past medical history, past surgical history, past social history, past family history, allergies, current medications, and problem list.    Past Medical History:   Diagnosis Date   • Atherosclerosis of both carotid arteries 2018 "   • Essential hypertension    • Fatigue    • Frequent headaches    • GERD (gastroesophageal reflux disease)    • History of anemia    • Hx of colonic polyps    • Hyperlipidemia    • Hypertension    • Insomnia    • Irritable bowel syndrome with constipation    • Multiple joint pain    • MARANDA (obstructive sleep apnea)     compliant with Bipap machine    • Palpitations    • Primary insomnia    • Rheumatic fever    • Sleep disturbance    • Vitamin D deficiency        Past Surgical History:   Procedure Laterality Date   • APPENDECTOMY     • ARTERIOVENOUS FISTULA REPAIR  1986   • EYE SURGERY     • HYSTERECTOMY  1994   • SHOULDER SURGERY  1997   • TONSILLECTOMY         Social History     Social History   • Marital status:      Spouse name: N/A   • Number of children: N/A   • Years of education: N/A     Occupational History   • Not on file.     Social History Main Topics   • Smoking status: Never Smoker   • Smokeless tobacco: Never Used      Comment: caffeine use   • Alcohol use Yes      Comment: social   • Drug use: No   • Sexual activity: No       Family History   Problem Relation Age of Onset   • Thyroid disease Mother    • Heart disease Mother    • Stroke Mother    • Hypertension Mother    • Heart disease Father    • Hypertension Sister    • Stroke Maternal Grandmother    • Diabetes Neg Hx        Review of Systems   Constitution: Positive for malaise/fatigue and weight gain. Negative for chills, diaphoresis, fever, night sweats and weight loss.   HENT: Negative for hearing loss, nosebleeds, sore throat and tinnitus.    Eyes: Negative for blurred vision, double vision, pain and visual disturbance.   Cardiovascular: Positive for leg swelling and palpitations. Negative for chest pain, claudication, cyanosis, dyspnea on exertion, irregular heartbeat, near-syncope, orthopnea, paroxysmal nocturnal dyspnea and syncope.   Respiratory: Negative for cough, hemoptysis, shortness of breath, snoring and wheezing.    Endocrine:  Negative for cold intolerance, heat intolerance and polyuria.   Hematologic/Lymphatic: Negative for bleeding problem. Does not bruise/bleed easily.   Skin: Negative for color change, dry skin, flushing and itching.   Musculoskeletal: Positive for joint pain. Negative for falls, joint swelling, muscle cramps, muscle weakness and myalgias.   Gastrointestinal: Negative for abdominal pain, constipation, heartburn, melena, nausea and vomiting.   Genitourinary: Negative for dysuria and hematuria.   Neurological: Positive for excessive daytime sleepiness, dizziness and headaches. Negative for light-headedness, loss of balance, numbness, paresthesias, seizures and vertigo.   Psychiatric/Behavioral: Negative for altered mental status, depression, memory loss and substance abuse. The patient does not have insomnia and is not nervous/anxious.    Allergic/Immunologic: Negative for environmental allergies.       Allergies   Allergen Reactions   • Gentamicin Anaphylaxis   • Vancomycin Anaphylaxis   • Ceclor [Cefaclor]    • Effexor [Venlafaxine]    • Keflex [Cephalexin]    • Maxzide [Hydrochlorothiazide W-Triamterene]    • Neurontin [Gabapentin]    • Penicillins    • Sulfa Antibiotics    • Tetracyclines & Related    • Zoloft [Sertraline Hcl]          Current Outpatient Prescriptions:   •  acyclovir (ZOVIRAX) 200 MG capsule, TAKE 1 CAPSULE DAILY, Disp: 90 capsule, Rfl: 2  •  Biotin 5 MG tablet dispersible, Take  by mouth Daily., Disp: , Rfl:   •  carisoprodol (SOMA) 350 MG tablet, Take 1 tablet by mouth 3 (Three) Times a Day As Needed for Muscle Spasms., Disp: 12 tablet, Rfl: 0  •  cholecalciferol (VITAMIN D3) 1000 UNITS tablet, Take 2,000 Units by mouth daily., Disp: , Rfl:   •  cycloSPORINE (RESTASIS) 0.05 % ophthalmic emulsion, 1 drop 2 (two) times a day., Disp: , Rfl:   •  docusate sodium (COLACE) 250 MG capsule, Take 250 mg by mouth daily., Disp: , Rfl:   •  doxepin (SINEquan) 25 MG capsule, TAKE 1 CAPSULE DAILY, Disp: 90  "capsule, Rfl: 2  •  fluticasone (FLONASE) 50 MCG/ACT nasal spray, 2 sprays into each nostril Daily. Administer 2 sprays in each nostril for each dose., Disp: 1 bottle, Rfl: 12  •  Linaclotide (LINZESS) 145 MCG capsule, Take 1 capsule by mouth Daily., Disp: , Rfl:   •  Loratadine (CLARITIN) 10 MG capsule, Take 1 capsule by mouth Daily., Disp: , Rfl:   •  naproxen sodium (ALEVE) 220 MG tablet, Take 220 mg by mouth Daily., Disp: , Rfl:   •  nebivolol (BYSTOLIC) 5 MG tablet, Take 0.5 tablets by mouth Daily., Disp: 45 tablet, Rfl: 2  •  ramipril (ALTACE) 10 MG capsule, TAKE 1 CAPSULE DAILY, Disp: 90 capsule, Rfl: 3  •  ranitidine (ZANTAC) 150 MG tablet, Take 150 mg by mouth 2 (two) times a day., Disp: , Rfl:       Objective:     Vitals:    05/21/18 1006   BP: 128/80   Pulse: 78   Weight: 107 kg (234 lb 12.8 oz)   Height: 180.3 cm (71\")     Body mass index is 32.75 kg/m².    PHYSICAL EXAM:    Vitals Reviewed.   General Appearance: No acute distress, well developed and well nourished.   Eyes: Conjunctiva and lids: No erythema, swelling, or discharge. Sclera non-icteric. Wearing glasses.    HENT: Atraumatic, normocephalic. External eyes, ears, and nose normal. No hearing loss noted. Mucous membranes normal. Lips not cyanotic. Neck supple with no tenderness.  Respiratory: No signs of respiratory distress. Respiration rhythm and depth normal.   Clear to auscultation. No rales, crackles, rhonchi, or wheezing auscultated.   Cardiovascular:  Jugular Venous Pressure: Normal  Heart Rate and Rhythm: Normal, Heart Sounds: Normal S1 and S2. No S3 or S4 noted.  Murmurs: No murmurs noted. No rubs, thrills, or gallops.   Arterial Pulses: Carotid pulses normal. No carotid bruit noted. Posterior tibialis and dorsalis pedis pulses normal.   Lower Extremities: Bilateral lower extremity edema noted.  Gastrointestinal:  Abdomen soft, non-distended, non-tender. Normal bowel sounds. No hepatomegaly.   Musculoskeletal: Normal movement of " extremities  Skin and Nails: General appearance normal. No pallor, cyanosis, diaphoresis. Skin temperature normal. No clubbing of fingernails.   Psychiatric: Patient alert and oriented to person, place, and time. Speech and behavior appropriate. Normal mood and affect.       ECG 12 Lead  Date/Time: 5/21/2018 10:03 AM  Performed by: DO SPENCER  Authorized by: DO SPENCER   Comparison: compared with previous ECG from 11/21/2017  Similar to previous ECG  Rhythm: sinus rhythm  Rate: normal  BPM: 78  Conduction: conduction normal  ST Segments: ST segments normal  T Waves: T waves normal  QRS axis: normal  Other findings: LAE  Clinical impression: non-specific ECG  Comments: RSR V1 and V2 Prime              Assessment:       Diagnosis Plan   1. Essential hypertension     2. Chronic nonintractable headache, unspecified headache type     3. Localized edema     4. Palpitations     5. Bilateral carotid bruits  Duplex Carotid Ultrasound CAR   6. Atherosclerosis of both carotid arteries  Duplex Carotid Ultrasound CAR   7. Obstructive sleep apnea syndrome            Plan:       1.  Essential Hypertension: Blood pressure at home is been averaging less than 120s over 60s and heart rate well-controlled today.  She has been experiencing headaches, neck spasms, and lower extremity edema since starting Bystolic 2.5 mg daily.  I've recommended that she take the medication every other day for the next week and then stop the beta blocker.  We will then assess if her symptoms are attributed to the beta blocker with the drug holiday.  She will continue to monitor her blood pressure and heart rates at home, I've asked her to call with any concerns.  I've also provided her written instructions on how to follow a low-sodium diet.    2.  Localized Edema: I've recommended elevation of legs while sitting/lying down and how to follow a lower sodium diet.    3.  Palpitations: She's had brief palpitations for about 20 seconds that she  describes as a fluttering and lightheaded feeling all of her life.  Her symptoms are unchanged.  We discussed that it may worsen without the beta blocker and she will continue to monitor.    4.  Carotid Bruits/atherosclerosis: I've recommended a carotid ultrasound duplex to be completed this year after she was noted to have mild carotid disease on screening test last year.    5.  Obstructive Sleep Apnea: Compliant with BiPAP machine.    6.  I've asked her to call with any concerns until her follow-up appointment with me in 6-8 weeks.      As always, it has been a pleasure to participate in your patient's care.      Sincerely,         CARLIE Graham

## 2018-06-25 ENCOUNTER — HOSPITAL ENCOUNTER (OUTPATIENT)
Dept: CARDIOLOGY | Facility: HOSPITAL | Age: 68
Discharge: HOME | End: 2018-06-25
Admitting: NURSE PRACTITIONER

## 2018-06-25 ENCOUNTER — OFFICE VISIT (OUTPATIENT)
Dept: CARDIOLOGY | Facility: CLINIC | Age: 68
End: 2018-06-25

## 2018-06-25 VITALS
HEIGHT: 71 IN | DIASTOLIC BLOOD PRESSURE: 82 MMHG | SYSTOLIC BLOOD PRESSURE: 136 MMHG | BODY MASS INDEX: 33.26 KG/M2 | WEIGHT: 237.6 LBS | HEART RATE: 82 BPM

## 2018-06-25 DIAGNOSIS — R09.89 BILATERAL CAROTID BRUITS: ICD-10-CM

## 2018-06-25 DIAGNOSIS — I10 ESSENTIAL HYPERTENSION: Primary | ICD-10-CM

## 2018-06-25 DIAGNOSIS — R00.2 PALPITATIONS: ICD-10-CM

## 2018-06-25 DIAGNOSIS — I65.23 ATHEROSCLEROSIS OF BOTH CAROTID ARTERIES: ICD-10-CM

## 2018-06-25 DIAGNOSIS — R60.0 LOCALIZED EDEMA: ICD-10-CM

## 2018-06-25 PROCEDURE — 99214 OFFICE O/P EST MOD 30 MIN: CPT | Performed by: NURSE PRACTITIONER

## 2018-06-25 PROCEDURE — 93000 ELECTROCARDIOGRAM COMPLETE: CPT | Performed by: NURSE PRACTITIONER

## 2018-06-25 PROCEDURE — 93880 EXTRACRANIAL BILAT STUDY: CPT

## 2018-06-25 PROCEDURE — 93880 EXTRACRANIAL BILAT STUDY: CPT | Performed by: INTERNAL MEDICINE

## 2018-06-25 RX ORDER — HYDROCHLOROTHIAZIDE 25 MG/1
25 TABLET ORAL DAILY
Qty: 30 TABLET | Refills: 1 | Status: SHIPPED | OUTPATIENT
Start: 2018-06-25 | End: 2018-07-16

## 2018-06-25 NOTE — PROGRESS NOTES
"  Date of Office Visit: 2018  Encounter Provider: CARLIE Levy  Place of Service: [facility] CARDIOLOGY  Patient Name: Valery Zabala  :1950    Chief Complaint   Patient presents with   • Hypertension   :     HPI: Valery Zabala is a 68 y.o. female who presents today for follow up of fatigue, blood pressure, and lower extremity edema. She has a history of rheumatic fever, hypertension, GERD, hyperlipidemia, and sleep apnea compliant with BiPAP machine).  She had a vascular screening in 2017 which showed mild bilateral carotid disease, normal abdominal aorta, and GERALDO testing normal.  She had a normal nuclear stress test and echocardiogram in 2017.  She has a history of palpitations occurring \"all of my life\" in which she describes a fluttering sensation for a few seconds.  She is an established patient of Dr. Luanne Levin.    I initially evaluated her in the office on 18.  She reported that she had been feeling lousy, having headaches, neck spasms, and lower extremity edema which she felt may be secondary to her Bystolic.  She weaned off the medication and presents today for follow-up.    She has not noticed any difference of her symptoms since stopping the Bystolic.  She does not feel that her palpitations or fluttering sensation have increased without the beta blocker.  She continues to experience lower extremity edema and her blood pressure is occasionally elevated at nighttime.  She denies chest pain, shortness of air, dizziness, or syncope.    The following portion of the patient's history were reviewed and updated as appropriate: past medical history, past surgical history, past social history, past family history, allergies, current medications, and problem list.    Past Medical History:   Diagnosis Date   • Atherosclerosis of both carotid arteries 2018   • Essential hypertension    • Fatigue    • Frequent headaches    • GERD " (gastroesophageal reflux disease)    • History of anemia    • Hx of colonic polyps    • Hyperlipidemia    • Hypertension    • Insomnia    • Irritable bowel syndrome with constipation    • Multiple joint pain    • MARANDA (obstructive sleep apnea)     compliant with Bipap machine    • Palpitations    • Primary insomnia    • Rheumatic fever    • Sleep disturbance    • Vitamin D deficiency        Past Surgical History:   Procedure Laterality Date   • APPENDECTOMY     • ARTERIOVENOUS FISTULA REPAIR  1986   • EYE SURGERY     • HYSTERECTOMY  1994   • SHOULDER SURGERY  1997   • TONSILLECTOMY         Social History     Social History   • Marital status:      Spouse name: N/A   • Number of children: N/A   • Years of education: N/A     Occupational History   • Not on file.     Social History Main Topics   • Smoking status: Never Smoker   • Smokeless tobacco: Never Used      Comment: caffeine use   • Alcohol use Yes      Comment: social   • Drug use: No   • Sexual activity: No       Family History   Problem Relation Age of Onset   • Thyroid disease Mother    • Heart disease Mother    • Stroke Mother    • Hypertension Mother    • Heart disease Father    • Hypertension Sister    • Stroke Maternal Grandmother    • Diabetes Neg Hx        Review of Systems   Constitution: Positive for malaise/fatigue. Negative for chills, diaphoresis, fever, night sweats, weight gain and weight loss.   HENT: Negative for hearing loss, nosebleeds, sore throat and tinnitus.    Eyes: Negative for blurred vision, double vision, pain and visual disturbance.   Cardiovascular: Positive for leg swelling. Negative for chest pain, claudication, cyanosis, dyspnea on exertion, irregular heartbeat, near-syncope, orthopnea, palpitations, paroxysmal nocturnal dyspnea and syncope.   Respiratory: Negative for cough, hemoptysis, shortness of breath, snoring and wheezing.    Endocrine: Negative for cold intolerance, heat intolerance and polyuria.    Hematologic/Lymphatic: Negative for bleeding problem. Does not bruise/bleed easily.   Skin: Negative for color change, dry skin, flushing and itching.   Musculoskeletal: Negative for falls, joint pain, joint swelling, muscle cramps, muscle weakness and myalgias.   Gastrointestinal: Negative for abdominal pain, constipation, heartburn, melena, nausea and vomiting.   Genitourinary: Negative for dysuria and hematuria.   Neurological: Positive for headaches. Negative for excessive daytime sleepiness, dizziness, light-headedness, loss of balance, numbness, paresthesias, seizures and vertigo.   Psychiatric/Behavioral: Negative for altered mental status, depression, memory loss and substance abuse. The patient does not have insomnia and is not nervous/anxious.    Allergic/Immunologic: Negative for environmental allergies.       Allergies   Allergen Reactions   • Gentamicin Anaphylaxis   • Vancomycin Anaphylaxis   • Ceclor [Cefaclor]    • Effexor [Venlafaxine]    • Keflex [Cephalexin]    • Maxzide [Hydrochlorothiazide W-Triamterene] Other (See Comments)     Racing heartbeat   • Neurontin [Gabapentin]    • Penicillins    • Sulfa Antibiotics    • Tetracyclines & Related    • Zoloft [Sertraline Hcl]          Current Outpatient Prescriptions:   •  acyclovir (ZOVIRAX) 200 MG capsule, TAKE 1 CAPSULE DAILY, Disp: 90 capsule, Rfl: 2  •  Biotin 5 MG tablet dispersible, Take  by mouth Daily., Disp: , Rfl:   •  carisoprodol (SOMA) 350 MG tablet, Take 1 tablet by mouth 3 (Three) Times a Day As Needed for Muscle Spasms., Disp: 12 tablet, Rfl: 0  •  cholecalciferol (VITAMIN D3) 1000 UNITS tablet, Take 2,000 Units by mouth daily., Disp: , Rfl:   •  cycloSPORINE (RESTASIS) 0.05 % ophthalmic emulsion, 1 drop 2 (two) times a day., Disp: , Rfl:   •  docusate sodium (COLACE) 250 MG capsule, Take 250 mg by mouth daily., Disp: , Rfl:   •  doxepin (SINEquan) 25 MG capsule, TAKE 1 CAPSULE DAILY, Disp: 90 capsule, Rfl: 2  •  fluticasone (FLONASE)  "50 MCG/ACT nasal spray, 2 sprays into each nostril Daily. Administer 2 sprays in each nostril for each dose., Disp: 1 bottle, Rfl: 12  •  Linaclotide (LINZESS) 145 MCG capsule, Take 1 capsule by mouth Daily., Disp: , Rfl:   •  Loratadine (CLARITIN) 10 MG capsule, Take 1 capsule by mouth Daily., Disp: , Rfl:   •  naproxen sodium (ALEVE) 220 MG tablet, Take 220 mg by mouth Daily., Disp: , Rfl:   •  ramipril (ALTACE) 10 MG capsule, TAKE 1 CAPSULE DAILY, Disp: 90 capsule, Rfl: 3  •  ranitidine (ZANTAC) 150 MG tablet, Take 150 mg by mouth 2 (two) times a day., Disp: , Rfl:   •  hydrochlorothiazide (HYDRODIURIL) 25 MG tablet, Take 1 tablet by mouth Daily., Disp: 30 tablet, Rfl: 1  •  nebivolol (BYSTOLIC) 5 MG tablet, Take 0.5 tablets by mouth Daily., Disp: 45 tablet, Rfl: 2      Objective:     Vitals:    06/25/18 1341   BP: 136/82   Pulse: 82   Weight: 108 kg (237 lb 9.6 oz)   Height: 180.3 cm (71\")     Body mass index is 33.14 kg/m².    PHYSICAL EXAM:    Vitals Reviewed.   General Appearance: No acute distress, well developed and well nourished.   Eyes: Conjunctiva and lids: No erythema, swelling, or discharge. Sclera non-icteric.   HENT: Atraumatic, normocephalic. External eyes, ears, and nose normal. No hearing loss noted. Mucous membranes normal. Lips not cyanotic. Neck supple with no tenderness.  Respiratory: No signs of respiratory distress. Respiration rhythm and depth normal.   Clear to auscultation. No rales, crackles, rhonchi, or wheezing auscultated.   Cardiovascular:  Jugular Venous Pressure: Normal  Heart Rate and Rhythm: Normal, Heart Sounds: Normal S1 and S2. No S3 or S4 noted.  Murmurs: No murmurs noted. No rubs, thrills, or gallops.   Arterial Pulses: Carotid pulses normal. No carotid bruit noted. Posterior tibialis and dorsalis pedis pulses normal.   Lower Extremities: +1 bilateral lower extremity edema noted.  Gastrointestinal:  Abdomen soft, non-distended, non-tender. Normal bowel sounds. No " hepatomegaly.   Musculoskeletal: Normal movement of extremities  Skin and Nails: General appearance normal. No pallor, cyanosis, diaphoresis. Skin temperature normal. No clubbing of fingernails.   Psychiatric: Patient alert and oriented to person, place, and time. Speech and behavior appropriate. Normal mood and affect.       ECG 12 Lead  Date/Time: 6/25/2018 1:40 PM  Performed by: DO SPENCER  Authorized by: DO SPENCER   Comparison: compared with previous ECG from 5/21/2018  Similar to previous ECG  Rhythm: sinus rhythm  Rate: normal  BPM: 82  Conduction: conduction normal  T Waves: T waves normal  QRS axis: normal  Other findings: LAE  Clinical impression: non-specific ECG              Assessment:       Diagnosis Plan   1. Essential hypertension  Basic Metabolic Panel   2. Localized edema  Basic Metabolic Panel   3. Palpitations  Basic Metabolic Panel          Plan:       1.  Hypertension: Her blood pressure machine was double checked for accuracy and is running higher than our manual blood pressure check.  We are going to start hydrochlorothiazide 25 mg 1 tablet daily in the morning and recheck a BMP in 7-10 days.  She is going to have her blood pressure machine recalibrated and continue to monitor at home.  I will follow-up with her via telephone about her blood pressure readings in about a month and have recommended a low-sodium diet 1500 mg a day or less (education was provided).    2.  Localized Edema: Hopefully with the addition of hydrochlorothiazide and following a low sodium diet her lower extremity edema will improve.    3.  Palpitations: Chronic and unchanged.  She is off the beta blocker and her palpitations have not worsened.    4.  She had a carotid duplex completed today in the office after she was noted to have mild carotid disease per screening last year.  I will follow-up with her with the results.    5.  Sleep apnea: She is compliant with her BiPAP machine.    6.  Rheumatic Fever:  Occurred in childhood and no affect on her heart valves.    7.  I've recommended follow-up with me or Dr. Levin in 3 months.    As always, it has been a pleasure to participate in your patient's care.      Sincerely,         CARLIE Graham

## 2018-06-26 LAB
BH CV XLRA MEAS LEFT DIST CCA EDV: -28.5 CM/SEC
BH CV XLRA MEAS LEFT DIST CCA PSV: -79.6 CM/SEC
BH CV XLRA MEAS LEFT DIST ICA EDV: -28.8 CM/SEC
BH CV XLRA MEAS LEFT DIST ICA PSV: -70.4 CM/SEC
BH CV XLRA MEAS LEFT ICA/CCA RATIO: 0.9
BH CV XLRA MEAS LEFT MID CCA EDV: -29.5 CM/SEC
BH CV XLRA MEAS LEFT MID CCA PSV: -86.4 CM/SEC
BH CV XLRA MEAS LEFT MID ICA EDV: -35.2 CM/SEC
BH CV XLRA MEAS LEFT MID ICA PSV: -76.8 CM/SEC
BH CV XLRA MEAS LEFT PROX ECA PSV: -86.1 CM/SEC
BH CV XLRA MEAS LEFT PROX ICA EDV: -32.7 CM/SEC
BH CV XLRA MEAS LEFT PROX ICA PSV: -75.4 CM/SEC
BH CV XLRA MEAS LEFT PROX SCLA PSV: 130 CM/SEC
BH CV XLRA MEAS LEFT VERTEBRAL A PSV: -39.6 CM/SEC
BH CV XLRA MEAS RIGHT DIST CCA EDV: -23.6 CM/SEC
BH CV XLRA MEAS RIGHT DIST CCA PSV: -81.7 CM/SEC
BH CV XLRA MEAS RIGHT DIST ICA EDV: -37.9 CM/SEC
BH CV XLRA MEAS RIGHT DIST ICA PSV: -96 CM/SEC
BH CV XLRA MEAS RIGHT ICA/CCA RATIO: 1.3
BH CV XLRA MEAS RIGHT MID CCA EDV: 21.2 CM/SEC
BH CV XLRA MEAS RIGHT MID CCA PSV: 60.5 CM/SEC
BH CV XLRA MEAS RIGHT MID ICA EDV: -40.9 CM/SEC
BH CV XLRA MEAS RIGHT MID ICA PSV: -100 CM/SEC
BH CV XLRA MEAS RIGHT PROX ECA PSV: -108 CM/SEC
BH CV XLRA MEAS RIGHT PROX ICA EDV: -36.1 CM/SEC
BH CV XLRA MEAS RIGHT PROX ICA PSV: -109 CM/SEC
BH CV XLRA MEAS RIGHT PROX SCLA PSV: 126 CM/SEC
BH CV XLRA MEAS RIGHT VERTEBRAL A PSV: -30.2 CM/SEC

## 2018-06-28 ENCOUNTER — LAB (OUTPATIENT)
Dept: INTERNAL MEDICINE | Facility: CLINIC | Age: 68
End: 2018-06-28

## 2018-06-28 ENCOUNTER — TELEPHONE (OUTPATIENT)
Dept: CARDIOLOGY | Facility: CLINIC | Age: 68
End: 2018-06-28

## 2018-06-28 DIAGNOSIS — R53.82 CHRONIC FATIGUE: ICD-10-CM

## 2018-06-28 DIAGNOSIS — R25.2 LEG CRAMPS: Primary | ICD-10-CM

## 2018-06-28 DIAGNOSIS — R25.2 LEG CRAMPS: ICD-10-CM

## 2018-06-28 LAB
ANION GAP SERPL CALCULATED.3IONS-SCNC: 15.7 MMOL/L
BUN BLD-MCNC: 21 MG/DL (ref 8–23)
BUN/CREAT SERPL: 20.8 (ref 7–25)
CALCIUM SPEC-SCNC: 10.1 MG/DL (ref 8.6–10.5)
CHLORIDE SERPL-SCNC: 97 MMOL/L (ref 98–107)
CO2 SERPL-SCNC: 27.3 MMOL/L (ref 22–29)
CREAT BLD-MCNC: 1.01 MG/DL (ref 0.57–1)
GFR SERPL CREATININE-BSD FRML MDRD: 55 ML/MIN/1.73
GLUCOSE BLD-MCNC: 116 MG/DL (ref 65–99)
MAGNESIUM SERPL-MCNC: 2.2 MG/DL (ref 1.6–2.4)
POTASSIUM BLD-SCNC: 4 MMOL/L (ref 3.5–5.2)
SODIUM BLD-SCNC: 140 MMOL/L (ref 136–145)
TSH SERPL-ACNC: 2.79 MIU/ML (ref 0.27–4.2)

## 2018-06-28 PROCEDURE — 83735 ASSAY OF MAGNESIUM: CPT | Performed by: NURSE PRACTITIONER

## 2018-06-28 PROCEDURE — 80048 BASIC METABOLIC PNL TOTAL CA: CPT | Performed by: NURSE PRACTITIONER

## 2018-06-28 NOTE — TELEPHONE ENCOUNTER
She needs to have blood work completed at the hospital to test her magnesium and potassium.  She had TSH level completed yesterday, and I have added the orders to previous blood work.

## 2018-06-28 NOTE — TELEPHONE ENCOUNTER
[phone]    Pt called stating that she was given a diuretic at last OV.  She states she is now awoken during the night with terrible leg cramps.  She states she is drinking 8-10 c a day of water with no relief.     Can you advise?    [facility]

## 2018-06-29 RX ORDER — POTASSIUM CHLORIDE 20 MEQ/1
20 TABLET, EXTENDED RELEASE ORAL DAILY
Qty: 30 TABLET | Refills: 2 | Status: SHIPPED | OUTPATIENT
Start: 2018-06-29 | End: 2018-07-16

## 2018-06-29 NOTE — TELEPHONE ENCOUNTER
I reviewed labs and carotid duplex with patient. She is going to start potassium 20 mEq 1 tablet daily and a prescription is been called in.  We'll see if this helps her leg cramps.  If not we may need to decrease the HCTZ or discontinue it altogether. I will follow-up via telephone in a week or 2.

## 2018-06-29 NOTE — TELEPHONE ENCOUNTER
"  Basic Metabolic Panel   Order: [id number]   Status:  Final result   Visible to patient:  No (Not Released) Dx:  Leg cramps     Ref Range & Units 08:31 2mo ago 1yr ago    Glucose 65 - 99 mg/dL 116   91  <100 mg/dL\" class=\"[id number]\">  93R     BUN 8 - 23 mg/dL 21  23  21R     Creatinine 0.57 - 1.00 mg/dL 1.01   0.98  0.82R     Sodium 136 - 145 mmol/L 140  142  143     Potassium 3.5 - 5.2 mmol/L 4.0  4.4  4.3R     Chloride 98 - 107 mmol/L 97   103  105R     CO2 22.0 - 29.0 mmol/L 27.3  25.5  28.0R     Calcium 8.6 - 10.5 mg/dL 10.1  9.1  8.9     eGFR Non African Amer >60 mL/min/1.73 55   57   70     BUN/Creatinine Ratio 7.0 - 25.0 20.8  23.5  25.6      Anion Gap mmol/L 15.7  13.5  10.0    Resulting Agency   [facility]   Narrative     GFR Normal >60  Chronic Kidney Disease <60  Kidney Failure <15      Specimen Collected: 06/28/18 08:31 Last Resulted: 06/28/18 17:02              R=Reference range differs from displayed range              Magnesium   Order: [id number]   Status:  Final result   Visible to patient:  No (Not Released) Dx:  Leg cramps    Ref Range & Units 08:31   Magnesium 1.6 - 2.4 mg/dL 2.2            Creatinine elevated so she may be dry. Increase water and possibly start potassium supplement.     Carotid Duplex Results:    · Proximal right internal carotid artery mild stenosis.  · Proximal left internal carotid artery is normal.  · There is plaquing noted in bilateral carotid bowel    I will recommend risk factor modification.       "

## 2018-07-13 ENCOUNTER — TELEPHONE (OUTPATIENT)
Dept: CARDIOLOGY | Facility: CLINIC | Age: 68
End: 2018-07-13

## 2018-07-13 NOTE — TELEPHONE ENCOUNTER
----- Message from CARLIE Dasilva sent at 6/29/2018 11:06 AM EDT -----    Call patient in 2 weeks for reassessment of leg cramps on potassium supplement  BMP?

## 2018-07-16 NOTE — TELEPHONE ENCOUNTER
She did not do well with potassium. She is taking Bystolic again and feels better. She does not want to take the water pill. Blood pressure is better.

## 2018-09-25 ENCOUNTER — OFFICE VISIT (OUTPATIENT)
Dept: CARDIOLOGY | Facility: CLINIC | Age: 68
End: 2018-09-25

## 2018-09-25 VITALS
SYSTOLIC BLOOD PRESSURE: 110 MMHG | WEIGHT: 246.6 LBS | BODY MASS INDEX: 34.52 KG/M2 | HEART RATE: 83 BPM | DIASTOLIC BLOOD PRESSURE: 70 MMHG | HEIGHT: 71 IN

## 2018-09-25 DIAGNOSIS — R60.0 LOCALIZED EDEMA: ICD-10-CM

## 2018-09-25 DIAGNOSIS — R00.2 PALPITATIONS: ICD-10-CM

## 2018-09-25 DIAGNOSIS — G47.33 OBSTRUCTIVE SLEEP APNEA SYNDROME: ICD-10-CM

## 2018-09-25 DIAGNOSIS — I65.23 ATHEROSCLEROSIS OF BOTH CAROTID ARTERIES: ICD-10-CM

## 2018-09-25 DIAGNOSIS — I10 ESSENTIAL HYPERTENSION: Primary | ICD-10-CM

## 2018-09-25 DIAGNOSIS — I10 ESSENTIAL HYPERTENSION: ICD-10-CM

## 2018-09-25 DIAGNOSIS — I00 RHEUMATIC FEVER: ICD-10-CM

## 2018-09-25 PROCEDURE — 93000 ELECTROCARDIOGRAM COMPLETE: CPT | Performed by: NURSE PRACTITIONER

## 2018-09-25 PROCEDURE — 99214 OFFICE O/P EST MOD 30 MIN: CPT | Performed by: NURSE PRACTITIONER

## 2018-09-25 RX ORDER — NEBIVOLOL 5 MG/1
2.5 TABLET ORAL DAILY
Qty: 45 TABLET | Refills: 2 | Status: SHIPPED | OUTPATIENT
Start: 2018-09-25 | End: 2019-06-19 | Stop reason: SDUPTHER

## 2018-09-25 RX ORDER — RAMIPRIL 10 MG
CAPSULE ORAL
Qty: 90 CAPSULE | Refills: 3 | Status: SHIPPED | OUTPATIENT
Start: 2018-09-25 | End: 2019-12-27

## 2018-09-25 RX ORDER — OMEPRAZOLE 20 MG/1
20 CAPSULE, DELAYED RELEASE ORAL DAILY
COMMUNITY

## 2018-09-25 NOTE — PROGRESS NOTES
"  Date of Office Visit: 2018  Encounter Provider: CARLIE Levy  Place of Service: [facility] CARDIOLOGY  Patient Name: Valery Zabala  :1950    Chief Complaint   Patient presents with   • Essential hypertension   :     HPI: Valery Zabala is a 68 y.o. female who presents today for follow-up of blood pressure. She has a history of rheumatic fever, hypertension, GERD, hyperlipidemia, and sleep apnea compliant with BiPAP machine).  She had a vascular screening in 2017 which showed mild bilateral carotid disease, normal abdominal aorta, and GERALDO testing normal.  She had a normal nuclear stress test and echocardiogram in 2017.  She had a follow-up carotid ultrasound in May 2018 which showed proximal right internal carotid artery mild stenosis (1%-15%) and plaquing noted in the bilateral carotid bowel. She has a history of palpitations occurring \"all of my life\" in which she describes a fluttering sensation for a few seconds.  She is an established patient of Dr. Luanne Levin.    I recently started her on Bystolic for better blood pressure control and she felt that she didn't tolerate the medication initially.  She has continued with the Bystolic this summer and denies any further adverse effects.  At one point she was on hydrochlorothiazide and developed leg cramps.  I placed her on potassium, but that did not help.  Subsequently she stop with the hydrochlorothiazide and potassium and her symptoms have resolved.  She continues to experience an occasional flutter after she drinks coffee in the morning that just last a few seconds with no other associated symptoms.  She denies chest pain, shortness of air, PND, orthopnea, dizziness, lightheadedness, or syncope.  She continues to experience trace lower extremity edema but this has also improved.  Her blood pressure is excellent today at 110/70.  Her blood pressures at home of ranged 100//77 with heart " rates averaging in the 70s.    Past Medical History:   Diagnosis Date   • Atherosclerosis of both carotid arteries 05/21/2018    carotid duplex 6/2018: mild right proximal stenosis; left normal; plaque bilateral carotid bowel    • Essential hypertension    • Fatigue    • Frequent headaches    • GERD (gastroesophageal reflux disease)    • History of anemia    • Hx of colonic polyps    • Hyperlipidemia    • Hypertension    • Insomnia    • Irritable bowel syndrome with constipation    • Multiple joint pain    • MARANDA (obstructive sleep apnea)     compliant with Bipap machine    • Palpitations    • Primary insomnia    • Rheumatic fever    • Sleep disturbance    • Vitamin D deficiency        Past Surgical History:   Procedure Laterality Date   • APPENDECTOMY     • ARTERIOVENOUS FISTULA REPAIR  1986   • EYE SURGERY     • HYSTERECTOMY  1994   • SHOULDER SURGERY  1997   • TONSILLECTOMY         Social History     Social History   • Marital status:      Spouse name: N/A   • Number of children: N/A   • Years of education: N/A     Occupational History   • Not on file.     Social History Main Topics   • Smoking status: Never Smoker   • Smokeless tobacco: Never Used      Comment: caffeine use   • Alcohol use Yes      Comment: social   • Drug use: No   • Sexual activity: No       Family History   Problem Relation Age of Onset   • Thyroid disease Mother    • Heart disease Mother    • Stroke Mother    • Hypertension Mother    • Heart disease Father    • Hypertension Sister    • Stroke Maternal Grandmother    • Diabetes Neg Hx        Review of Systems   Constitution: Negative for chills, diaphoresis, fever, malaise/fatigue, night sweats, weight gain and weight loss.   HENT: Negative for hearing loss, nosebleeds, sore throat and tinnitus.    Eyes: Negative for blurred vision, double vision, pain and visual disturbance.   Cardiovascular: Positive for leg swelling and palpitations. Negative for chest pain, claudication, cyanosis,  dyspnea on exertion, irregular heartbeat, near-syncope, orthopnea, paroxysmal nocturnal dyspnea and syncope.   Respiratory: Negative for cough, hemoptysis, shortness of breath, snoring and wheezing.    Endocrine: Negative for cold intolerance, heat intolerance and polyuria.   Hematologic/Lymphatic: Negative for bleeding problem. Does not bruise/bleed easily.   Skin: Negative for color change, dry skin, flushing and itching.   Musculoskeletal: Negative for falls, joint pain, joint swelling, muscle cramps, muscle weakness and myalgias.   Gastrointestinal: Negative for abdominal pain, constipation, heartburn, melena, nausea and vomiting.   Genitourinary: Negative for dysuria and hematuria.   Neurological: Negative for excessive daytime sleepiness, dizziness, headaches, light-headedness, loss of balance, numbness, paresthesias, seizures and vertigo.   Psychiatric/Behavioral: Negative for altered mental status, depression, memory loss and substance abuse. The patient does not have insomnia and is not nervous/anxious.    Allergic/Immunologic: Negative for environmental allergies.       Allergies   Allergen Reactions   • Gentamicin Anaphylaxis   • Vancomycin Anaphylaxis   • Ceclor [Cefaclor]    • Effexor [Venlafaxine]    • Keflex [Cephalexin]    • Maxzide [Hydrochlorothiazide W-Triamterene] Other (See Comments)     Racing heartbeat   • Neurontin [Gabapentin]    • Penicillins    • Sulfa Antibiotics    • Tetracyclines & Related    • Zoloft [Sertraline Hcl]          Current Outpatient Prescriptions:   •  acyclovir (ZOVIRAX) 200 MG capsule, TAKE 1 CAPSULE DAILY, Disp: 90 capsule, Rfl: 2  •  carisoprodol (SOMA) 350 MG tablet, Take 1 tablet by mouth 3 (Three) Times a Day As Needed for Muscle Spasms., Disp: 12 tablet, Rfl: 0  •  cholecalciferol (VITAMIN D3) 1000 UNITS tablet, Take 2,000 Units by mouth daily., Disp: , Rfl:   •  cycloSPORINE (RESTASIS) 0.05 % ophthalmic emulsion, 1 drop 2 (two) times a day., Disp: , Rfl:   •   "docusate sodium (COLACE) 250 MG capsule, Take 250 mg by mouth daily., Disp: , Rfl:   •  doxepin (SINEquan) 25 MG capsule, TAKE 1 CAPSULE DAILY, Disp: 90 capsule, Rfl: 2  •  fluticasone (FLONASE) 50 MCG/ACT nasal spray, 2 sprays into each nostril Daily. Administer 2 sprays in each nostril for each dose., Disp: 1 bottle, Rfl: 12  •  Linaclotide (LINZESS) 145 MCG capsule, Take 1 capsule by mouth Daily., Disp: , Rfl:   •  Loratadine (CLARITIN) 10 MG capsule, Take 1 capsule by mouth Daily., Disp: , Rfl:   •  naproxen sodium (ALEVE) 220 MG tablet, Take 220 mg by mouth Daily., Disp: , Rfl:   •  nebivolol (BYSTOLIC) 5 MG tablet, Take 0.5 tablets by mouth Daily., Disp: 45 tablet, Rfl: 2  •  omeprazole (priLOSEC) 20 MG capsule, Take 20 mg by mouth Daily., Disp: , Rfl:   •  ramipril (ALTACE) 10 MG capsule, TAKE 1 CAPSULE DAILY, Disp: 90 capsule, Rfl: 3      Objective:     Vitals:    09/25/18 0919   BP: 110/70   Pulse: 83   Weight: 112 kg (246 lb 9.6 oz)   Height: 180.3 cm (71\")     Body mass index is 34.39 kg/m².    PHYSICAL EXAM:    Vitals Reviewed.   General Appearance: No acute distress, well developed and well nourished. Obese.   Eyes: Conjunctiva and lids: No erythema, swelling, or discharge. Sclera non-icteric. Glasses.   HENT: Atraumatic, normocephalic. External eyes, ears, and nose normal. No hearing loss noted. Mucous membranes normal. Lips not cyanotic. Neck supple with no tenderness.  Respiratory: No signs of respiratory distress. Respiration rhythm and depth normal.   Clear to auscultation. No rales, crackles, rhonchi, or wheezing auscultated.   Cardiovascular:  Jugular Venous Pressure: Normal  Heart Rate and Rhythm: Normal, Heart Sounds: Normal S1 and S2. No S3 or S4 noted.  Murmurs: No murmurs noted. No rubs, thrills, or gallops.   Arterial Pulses: Carotid pulses normal. No carotid bruit noted. Posterior tibialis and dorsalis pedis pulses normal.   Lower Extremities: Trace bilateral lower extremity edema " noted.  Gastrointestinal:  Abdomen soft, non-distended, non-tender. Normal bowel sounds. No hepatomegaly.   Musculoskeletal: Normal movement of extremities  Skin and Nails: General appearance normal. No pallor, cyanosis, diaphoresis. Skin temperature normal. No clubbing of fingernails.   Psychiatric: Patient alert and oriented to person, place, and time. Speech and behavior appropriate. Normal mood and affect.       ECG 12 Lead  Date/Time: 9/25/2018 9:07 AM  Performed by: DO SPENCER  Authorized by: DO SPENCER   Comparison: compared with previous ECG from 6/25/2018  Rhythm: sinus rhythm  Rate: normal  BPM: 83  Conduction: conduction normal  ST Segments: ST segments normal  T Waves: T waves normal  QRS axis: normal  Other: no other findings  Clinical impression: non-specific ECG  Comments: RSR V1 and V2 Prime              Assessment:       Diagnosis Plan   1. Essential hypertension     2. Localized edema     3. Palpitations     4. Atherosclerosis of both carotid arteries     5. Obstructive sleep apnea syndrome     6. Rheumatic fever            Plan:       1.  Hypertension: Blood pressure is very well-controlled on her current dose of Bystolic 2.5 mg daily.  I have refilled her prescription.  She will continue with weight loss, low sodium diet, and have recommended exercise.  I've asked her to call with any concerns about her blood pressure.    2.  Localized Edema: She still has trace lower extremity edema present, but this has improved with a lower sodium diet.    3.  Palpitations: Chronic and unchanged.      4.  Carotid Atherosclerosis: carotid ultrasound in May 2018 which showed proximal right internal carotid artery mild stenosis (1%-15%) and plaquing noted in the bilateral carotid bowel    5.  Sleep apnea: She is compliant with her BiPAP machine.    6.  Rheumatic Fever: Occurred in childhood and no affect on her heart valves.    7.  I've recommended follow-up Dr. Levin in 6 months, unless otherwise  needed sooner.     As always, it has been a pleasure to participate in your patient's care.      Sincerely,         CARLIE Graham

## 2018-10-15 ENCOUNTER — OFFICE VISIT (OUTPATIENT)
Dept: INTERNAL MEDICINE | Facility: CLINIC | Age: 68
End: 2018-10-15

## 2018-10-15 VITALS
OXYGEN SATURATION: 99 % | HEIGHT: 71 IN | SYSTOLIC BLOOD PRESSURE: 122 MMHG | BODY MASS INDEX: 34.58 KG/M2 | DIASTOLIC BLOOD PRESSURE: 72 MMHG | HEART RATE: 94 BPM | WEIGHT: 247 LBS

## 2018-10-15 DIAGNOSIS — I80.9 PHLEBITIS: Primary | ICD-10-CM

## 2018-10-15 PROCEDURE — 99213 OFFICE O/P EST LOW 20 MIN: CPT | Performed by: NURSE PRACTITIONER

## 2018-10-15 NOTE — PROGRESS NOTES
Subjective   Valery Zabala is a 68 y.o. female.     She reports about one week ago she noticed left lower leg rash.She reports it was intially red and warm, but that has resolved. It has slowly improved. She does have chronic swelling and reports that she had swelling after traveling in vehicle. She was at [facility] at that time and denies any hiking, no injury. She denies any calf pain (does have chronic leg pains)/. She is unsure of any injuries. She did apply Bactroban for two days.       Leg Pain    The incident occurred more than 1 week ago. There was no injury mechanism. The pain is present in the left leg. The pain is at a severity of 0/10. The patient is experiencing no pain. Associated symptoms include tingling (in left lateral calf ). Pertinent negatives include no numbness. Treatments tried: bactroban (the last two days)         The following portions of the patient's history were reviewed and updated as appropriate: allergies, current medications, past family history, past medical history, past social history, past surgical history and problem list.    Review of Systems   Constitutional: Negative for activity change, appetite change, fatigue and fever.   Respiratory: Negative for cough, shortness of breath and wheezing.    Cardiovascular: Positive for leg swelling. Negative for chest pain and palpitations.   Gastrointestinal: Negative for abdominal pain.   Skin:        Left lateral leg, gradual improving    Neurological: Positive for tingling (in left lateral calf ). Negative for numbness.       Objective   Physical Exam   Constitutional: She is oriented to person, place, and time. She appears well-developed and well-nourished.   HENT:   Head: Normocephalic.   Nose: Nose normal.   Cardiovascular: Regular rhythm and normal heart sounds.  Exam reveals no S3 and no S4.    No murmur heard.  Negative homans sign   No redness, streaking or warmth noted    Pulmonary/Chest: Effort normal and breath sounds  normal. She has no decreased breath sounds. She has no wheezes. She has no rhonchi. She has no rales.   Musculoskeletal: She exhibits no edema.   Neurological: She is alert and oriented to person, place, and time. Gait normal.   Skin: Skin is warm and dry.   Purplish with petchie area to left lower lateral leg measuring 10 cm x 6 cm, blanches with palpation   Psychiatric: She has a normal mood and affect.       Assessment/Plan   Valery was seen today for leg pain.    Diagnoses and all orders for this visit:    Phlebitis  Comments:  needs to monitor; if worsening of symptoms to return to clinic; needs compression stockings     Dr Howell reviewed. She has been instructed to returned if any warmth, streaking, pain, etc.

## 2018-10-15 NOTE — PATIENT INSTRUCTIONS
Phlebitis  Phlebitis is soreness and swelling (inflammation) of a vein. This can occur in your arms, legs, or torso (trunk), as well as deeper inside your body. Phlebitis is usually not serious when it occurs close to the surface of the body. However, it can cause serious problems when it occurs in a vein deeper inside the body.  What are the causes?  Phlebitis can be triggered by various things, including:  · Reduced blood flow through your veins. This can happen with:  ? Bed rest over a long period.  ? Long-distance travel.  ? Injury.  ? Surgery.  ? Being overweight (obese) or pregnant.  · Having an IV tube put in the vein and getting certain medicines through the vein.  · Cancer and cancer treatment.  · Use of illegal drugs taken through the vein.  · Inflammatory diseases.  · Inherited (genetic) diseases that increase the risk of blood clots.  · Hormone therapy, such as birth control pills.    What are the signs or symptoms?  · Red, tender, swollen, and painful area on your skin. Usually, the area will be long and narrow.  · Firmness along the center of the affected area. This can indicate that a blood clot has formed.  · Low-grade fever.  How is this diagnosed?  A health care provider can usually diagnose phlebitis by examining the affected area and asking about your symptoms. To check for infection or blood clots, your health care provider may order blood tests or an ultrasound exam of the area. Blood tests and your family history may also indicate if you have an underlying genetic disease that causes blood clots. Occasionally, a piece of tissue is taken from the body (biopsy sample) if an unusual cause of phlebitis is suspected.  How is this treated?  Treatment will vary depending on the severity of the condition and the area of the body affected. Treatment may include:  · Use of a warm compress or heating pad.  · Use of compression stockings or bandages.  · Anti-inflammatory medicines.  · Removal of any IV  tube that may be causing the problem.  · Medicines that kill germs (antibiotics) if an infection is present.  · Blood-thinning medicines if a blood clot is suspected or present.  · In rare cases, surgery may be needed to remove damaged sections of vein.    Follow these instructions at home:  · Only take over-the-counter or prescription medicines as directed by your health care provider. Take all medicines exactly as prescribed.  · Raise (elevate) the affected area above the level of your heart as directed by your health care provider.  · Apply a warm compress or heating pad to the affected area as directed by your health care provider. Do not sleep with the heating pad.  · Use compression stockings or bandages as directed. These will speed healing and prevent the condition from coming back.  · If you are on blood thinners:  ? Get follow-up blood tests as directed by your health care provider.  ? Check with your health care provider before using any new medicines.  ? Carry a medical alert card or wear your medical alert jewelry to show that you are on blood thinners.  · For phlebitis in the legs:  ? Avoid prolonged standing or bed rest.  ? Keep your legs moving. Raise your legs when sitting or lying.  · Do not smoke.  · Women, particularly those over the age of 35, should consider the risks and benefits of taking the contraceptive pill. This kind of hormone treatment can increase your risk for blood clots.  · Follow up with your health care provider as directed.  Contact a health care provider if:  · You have unusual bruising or any bleeding problems.  · Your swelling or pain in the affected area is not improving.  · You are on anti-inflammatory medicine, and you develop belly (abdominal) pain.  Get help right away if:  · You have a sudden onset of chest pain or difficulty breathing.  · You have a fever or persistent symptoms for more than 2-3 days.  · You have a fever and your symptoms suddenly get worse.  This  information is not intended to replace advice given to you by your health care provider. Make sure you discuss any questions you have with your health care provider.  Document Released: 12/12/2002 Document Revised: 05/25/2017 Document Reviewed: 08/25/2014  Elsevier Interactive Patient Education © 2017 [employer]

## 2018-11-12 ENCOUNTER — OFFICE VISIT (OUTPATIENT)
Dept: INTERNAL MEDICINE | Facility: CLINIC | Age: 68
End: 2018-11-12

## 2018-11-12 VITALS
BODY MASS INDEX: 34.13 KG/M2 | WEIGHT: 243.8 LBS | HEIGHT: 71 IN | SYSTOLIC BLOOD PRESSURE: 124 MMHG | TEMPERATURE: 97.2 F | HEART RATE: 69 BPM | DIASTOLIC BLOOD PRESSURE: 80 MMHG | OXYGEN SATURATION: 100 %

## 2018-11-12 DIAGNOSIS — M25.562 CHRONIC PAIN OF BOTH KNEES: ICD-10-CM

## 2018-11-12 DIAGNOSIS — Z00.00 MEDICARE ANNUAL WELLNESS VISIT, SUBSEQUENT: ICD-10-CM

## 2018-11-12 DIAGNOSIS — I65.23 ATHEROSCLEROSIS OF BOTH CAROTID ARTERIES: ICD-10-CM

## 2018-11-12 DIAGNOSIS — E55.9 VITAMIN D DEFICIENCY: ICD-10-CM

## 2018-11-12 DIAGNOSIS — G89.29 CHRONIC PAIN OF BOTH KNEES: ICD-10-CM

## 2018-11-12 DIAGNOSIS — M25.561 CHRONIC PAIN OF BOTH KNEES: ICD-10-CM

## 2018-11-12 DIAGNOSIS — G47.33 OBSTRUCTIVE SLEEP APNEA SYNDROME: ICD-10-CM

## 2018-11-12 DIAGNOSIS — M62.838 MUSCLE SPASM: ICD-10-CM

## 2018-11-12 DIAGNOSIS — I10 ESSENTIAL HYPERTENSION: Primary | ICD-10-CM

## 2018-11-12 DIAGNOSIS — E78.49 OTHER HYPERLIPIDEMIA: ICD-10-CM

## 2018-11-12 PROCEDURE — 99213 OFFICE O/P EST LOW 20 MIN: CPT | Performed by: INTERNAL MEDICINE

## 2018-11-12 PROCEDURE — G0439 PPPS, SUBSEQ VISIT: HCPCS | Performed by: INTERNAL MEDICINE

## 2018-11-12 RX ORDER — CYCLOBENZAPRINE HCL 5 MG
5 TABLET ORAL 3 TIMES DAILY PRN
Qty: 30 TABLET | Refills: 0 | Status: SHIPPED | OUTPATIENT
Start: 2018-11-12 | End: 2019-01-08 | Stop reason: SDUPTHER

## 2018-11-12 NOTE — PATIENT INSTRUCTIONS
Medicare Wellness  Personal Prevention Plan of Service     Date of Office Visit:  2018  Encounter Provider:  Julianna Howell MD  Place of Service:  [facility] INTER[facility] MED[facility]  Patient Name: Valery Zabala  :  1950    As part of the Medicare Wellness portion of your visit today, we are providing you with this personalized preventive plan of services (PPPS). This plan is based upon recommendations of the [place] Preventive Services Task Force (USPSTF) and the Advisory Committee on Immunization Practices (ACIP).    This lists the preventive care services that should be considered, and provides dates of when you are due. Items listed as completed are up-to-date and do not require any further intervention.    Health Maintenance   Topic Date Due   • ZOSTER VACCINE (2 of 2) 2012   • LIPID PANEL  2019   • MAMMOGRAM  2019   • MEDICARE ANNUAL WELLNESS  2019   • DXA SCAN  2020   • TDAP/TD VACCINES (2 - Td) 2025   • COLONOSCOPY  2028   • HEPATITIS C SCREENING  Completed   • INFLUENZA VACCINE  Completed   • PNEUMOCOCCAL VACCINES (65+ LOW/MEDIUM RISK)  Completed       No orders of the defined types were placed in this encounter.      No Follow-up on file.

## 2018-11-12 NOTE — PROGRESS NOTES
"Subjective   Valery Zabala is a 68 y.o. female here for   Chief Complaint   Patient presents with   • Medicare Wellness-subsequent   • Hyperlipidemia   • Hypertension   • Insomnia   .    Vitals:    11/12/18 1307   BP: 124/80   BP Location: Left arm   Patient Position: Sitting   Cuff Size: Adult   Pulse: 69   Temp: 97.2 °F (36.2 °C)   TempSrc: Temporal   SpO2: 100%   Weight: 111 kg (243 lb 12.8 oz)   Height: 179.7 cm (70.75\")       Body mass index is 34.24 kg/m².    History of Present Illness     The following portions of the patient's history were reviewed and updated as appropriate: allergies, current medications, past social history and problem list.    Review of Systems   Constitutional: Negative for chills, fatigue, fever and unexpected weight change.   Eyes: Negative for visual disturbance.   Respiratory: Negative for cough, choking, shortness of breath and wheezing.    Cardiovascular: Negative for chest pain, palpitations and leg swelling.   Gastrointestinal: Negative for abdominal pain.   Genitourinary: Negative for hematuria.   Musculoskeletal: Positive for arthralgias (knees, hips).   Neurological: Negative for weakness.   Psychiatric/Behavioral: Negative for sleep disturbance and suicidal ideas. The patient is not nervous/anxious.        Objective   Physical Exam   Constitutional: She appears well-developed and well-nourished. No distress.   Cardiovascular: Normal rate, regular rhythm and normal heart sounds.   Pulmonary/Chest: No respiratory distress. She has no wheezes. She has no rales. She exhibits no tenderness.   Musculoskeletal: She exhibits no edema.   Psychiatric: She has a normal mood and affect. Her behavior is normal.   Nursing note and vitals reviewed.      Assessment/Plan   Diagnoses and all orders for this visit:    Essential hypertension  Comments:  stable with ramipril & bystolic    Other hyperlipidemia  Comments:  need diet/ex    Atherosclerosis of both carotid arteries  Comments:  f/u " with cardiol    Obstructive sleep apnea syndrome  Comments:  continue nightly cpap    Vitamin D deficiency  Comments:  need daily vit d    Medicare annual wellness visit, subsequent    Chronic pain of both knees  Comments:  bettew with PT, hyaluronic acid, steroid injections per ortho    Muscle spasm  Comments:  severe leg muscle cramps b/c valgus deformity - ok to use low dose flexeril prn    Other orders  -     cyclobenzaprine (FLEXERIL) 5 MG tablet; Take 1 tablet by mouth 3 (Three) Times a Day As Needed for Muscle Spasms.       Wellness today.   Need daily strengthening & balance exercises (shown today).   She had screening for carotid disease 5/2018.       Need shingrix.

## 2018-11-12 NOTE — PROGRESS NOTES
QUICK REFERENCE INFORMATION:  The ABCs of the Annual Wellness Visit    Subsequent Medicare Wellness Visit    HEALTH RISK ASSESSMENT    1950    Recent Hospitalizations:  No hospitalization(s) within the last year..        Current Medical Providers:  Patient Care Team:  Julianna Howell MD as PCP - General  Julianna Howell MD as PCP - Claims Attributed  Alix Alexander MD as Consulting Physician (Orthopedic Surgery)  Jorge Chairez MD as Consulting Physician (Otolaryngology)  Roman Hazel MD as Consulting Physician (Urology)  Patricio Flores MD as Consulting Physician (Allergy)  Sai Granda MD as Consulting Physician (Gastroenterology)  Magdalena Christensen MD as Consulting Physician (Dermatology)  Luanne Levin MD as Consulting Physician (Cardiology)        Smoking Status:  Social History     Tobacco Use   Smoking Status Former Smoker   • Years: 1.00   Smokeless Tobacco Never Used   Tobacco Comment    Daily caffeine use       Alcohol Consumption:  Social History     Substance and Sexual Activity   Alcohol Use Yes    Comment: social       Depression Screen:   PHQ-2/PHQ-9 Depression Screening 11/2/2017   Little interest or pleasure in doing things 0   Feeling down, depressed, or hopeless 0   Trouble falling or staying asleep, or sleeping too much 0   Feeling tired or having little energy 0   Poor appetite or overeating 0   Feeling bad about yourself - or that you are a failure or have let yourself or your family down 0   Trouble concentrating on things, such as reading the newspaper or watching television 0   Moving or speaking so slowly that other people could have noticed. Or the opposite - being so fidgety or restless that you have been moving around a lot more than usual 0   Thoughts that you would be better off dead, or of hurting yourself in some way 0   Total Score 0       Health Habits and Functional and Cognitive Screening:  Functional & Cognitive Status 11/2/2017   Do you  have difficulty preparing food and eating? No   Do you have difficulty bathing yourself, getting dressed or grooming yourself? No   Do you have difficulty using the toilet? No   Do you have difficulty moving around from place to place? Yes   Do you have trouble with steps or getting out of a bed or a chair? Yes   In the past year have you fallen or experienced a near fall? No   Do you need help using the phone?  No   Are you deaf or do you have serious difficulty hearing?  No   Do you need help with transportation? No   Do you need help shopping? No   Do you need help preparing meals?  No   Do you need help with housework?  No   Do you need help with laundry? No   Do you need help taking your medications? No   Do you need help managing money? No   Do you have difficulty concentrating, remembering or making decisions? No           Does the patient have evidence of cognitive impairment? No    Aspirin use counseling: Does not need ASA (and currently is not on it)      Recent Lab Results:  CMP:  Lab Results   Component Value Date    BUN 21 06/28/2018    CREATININE 1.01 (H) 06/28/2018    EGFRIFNONA 55 (L) 06/28/2018    BCR 20.8 06/28/2018     06/28/2018    K 4.0 06/28/2018    CO2 27.3 06/28/2018    CALCIUM 10.1 06/28/2018    ALBUMIN 3.80 04/24/2018    BILITOT 0.3 04/24/2018    ALKPHOS 68 04/24/2018    AST 25 04/24/2018    ALT 24 04/24/2018     Lipid Panel:  Lab Results   Component Value Date    CHOL 158 04/24/2018    TRIG 104 04/24/2018    HDL 37 (L) 04/24/2018    VLDL 20.8 04/24/2018    LDLHDL 2.71 04/24/2018     HbA1c:       Visual Acuity:  No exam data present    Age-appropriate Screening Schedule:  Refer to the list below for future screening recommendations based on patient's age, sex and/or medical conditions. Orders for these recommended tests are listed in the plan section. The patient has been provided with a written plan.    Health Maintenance   Topic Date Due   • ZOSTER VACCINE (2 of 2) 11/29/2012   •  LIPID PANEL  04/24/2019   • DXA SCAN  04/24/2020   • MAMMOGRAM  08/02/2020   • TDAP/TD VACCINES (2 - Td) 05/19/2025   • COLONOSCOPY  05/16/2028   • INFLUENZA VACCINE  Completed   • PNEUMOCOCCAL VACCINES (65+ LOW/MEDIUM RISK)  Completed        Subjective   History of Present Illness    Valery Zabala is a 68 y.o. female who presents for an Subsequent Wellness Visit.    The following portions of the patient's history were reviewed and updated as appropriate: allergies, current medications, past family history, past medical history, past social history, past surgical history and problem list.    Outpatient Medications Prior to Visit   Medication Sig Dispense Refill   • acyclovir (ZOVIRAX) 200 MG capsule TAKE 1 CAPSULE DAILY 90 capsule 2   • ALTACE 10 MG capsule TAKE 1 CAPSULE DAILY 90 capsule 3   • carisoprodol (SOMA) 350 MG tablet Take 1 tablet by mouth 3 (Three) Times a Day As Needed for Muscle Spasms. 12 tablet 0   • cholecalciferol (VITAMIN D3) 1000 UNITS tablet Take 2,000 Units by mouth daily.     • cycloSPORINE (RESTASIS) 0.05 % ophthalmic emulsion 1 drop 2 (two) times a day.     • docusate sodium (COLACE) 250 MG capsule Take 250 mg by mouth daily.     • doxepin (SINEquan) 25 MG capsule TAKE 1 CAPSULE DAILY 90 capsule 2   • fluticasone (FLONASE) 50 MCG/ACT nasal spray 2 sprays into each nostril Daily. Administer 2 sprays in each nostril for each dose. 1 bottle 12   • Linaclotide (LINZESS) 145 MCG capsule Take 1 capsule by mouth Daily.     • Loratadine (CLARITIN) 10 MG capsule Take 1 capsule by mouth Daily.     • naproxen sodium (ALEVE) 220 MG tablet Take 220 mg by mouth Daily.     • nebivolol (BYSTOLIC) 5 MG tablet Take 0.5 tablets by mouth Daily. 45 tablet 2   • omeprazole (priLOSEC) 20 MG capsule Take 20 mg by mouth Daily.       No facility-administered medications prior to visit.        Patient Active Problem List   Diagnosis   • Hypertension   • Hyperlipidemia   • Hx of colonic polyps   • Vitamin D deficiency  "  • Palpitations   • GERD (gastroesophageal reflux disease)   • IBS (irritable bowel syndrome)   • Insomnia   • Sleep apnea   • Slow transit constipation   • Multiple joint pain   • Other seasonal allergic rhinitis   • Chronic fatigue   • Localized edema   • Bilateral carotid bruits   • Atherosclerosis of both carotid arteries   • Rheumatic fever       Advance Care Planning:  has an advance directive - a copy has been provided and is in file    Identification of Risk Factors:  Risk factors include: weight , unhealthy diet, cardiovascular risk, increased fall risk and chronic pain.    Review of Systems    Compared to one year ago, the patient feels her physical health is worse.  Compared to one year ago, the patient feels her mental health is the same.    Objective     Physical Exam    Vitals:    11/12/18 1307   Weight: 111 kg (243 lb 12.8 oz)   Height: 179.7 cm (70.75\")  Comment: Correct Height       Patient's Body mass index is 34.24 kg/m². BMI is above normal parameters. Recommendations include: exercise counseling, no follow-up required and nutrition counseling.      Assessment/Plan   Patient Self-Management and Personalized Health Advice  The patient has been provided with information about: diet, exercise, weight management, prevention of cardiac or vascular disease, the relationship between weight and GERD and fall prevention and preventive services including:   · Counseling for cardiovascular disease risk reduction, Exercise counseling provided, Fall Risk assessment done, Fall Risk plan of care done, Nutrition counseling provided, Zostavax vaccine (Herpes Zoster).    Visit Diagnoses:  No diagnosis found.    No orders of the defined types were placed in this encounter.      Outpatient Encounter Medications as of 11/12/2018   Medication Sig Dispense Refill   • acyclovir (ZOVIRAX) 200 MG capsule TAKE 1 CAPSULE DAILY 90 capsule 2   • ALTACE 10 MG capsule TAKE 1 CAPSULE DAILY 90 capsule 3   • carisoprodol (SOMA) " 350 MG tablet Take 1 tablet by mouth 3 (Three) Times a Day As Needed for Muscle Spasms. 12 tablet 0   • cholecalciferol (VITAMIN D3) 1000 UNITS tablet Take 2,000 Units by mouth daily.     • cycloSPORINE (RESTASIS) 0.05 % ophthalmic emulsion 1 drop 2 (two) times a day.     • docusate sodium (COLACE) 250 MG capsule Take 250 mg by mouth daily.     • doxepin (SINEquan) 25 MG capsule TAKE 1 CAPSULE DAILY 90 capsule 2   • fluticasone (FLONASE) 50 MCG/ACT nasal spray 2 sprays into each nostril Daily. Administer 2 sprays in each nostril for each dose. 1 bottle 12   • Linaclotide (LINZESS) 145 MCG capsule Take 1 capsule by mouth Daily.     • Loratadine (CLARITIN) 10 MG capsule Take 1 capsule by mouth Daily.     • naproxen sodium (ALEVE) 220 MG tablet Take 220 mg by mouth Daily.     • nebivolol (BYSTOLIC) 5 MG tablet Take 0.5 tablets by mouth Daily. 45 tablet 2   • omeprazole (priLOSEC) 20 MG capsule Take 20 mg by mouth Daily.       No facility-administered encounter medications on file as of 11/12/2018.        Reviewed use of high risk medication in the elderly: not applicable  Reviewed for potential of harmful drug interactions in the elderly: not applicable    Follow Up:  No Follow-up on file.     An After Visit Summary and PPPS with all of these plans were given to the patient.

## 2018-11-19 ENCOUNTER — OFFICE (AMBULATORY)
Dept: URBAN - METROPOLITAN AREA CLINIC 75 | Facility: CLINIC | Age: 68
End: 2018-11-19

## 2018-11-19 VITALS
SYSTOLIC BLOOD PRESSURE: 130 MMHG | HEIGHT: 72 IN | WEIGHT: 243 LBS | DIASTOLIC BLOOD PRESSURE: 74 MMHG | HEART RATE: 81 BPM

## 2018-11-19 DIAGNOSIS — K64.4 RESIDUAL HEMORRHOIDAL SKIN TAGS: ICD-10-CM

## 2018-11-19 DIAGNOSIS — Z86.010 PERSONAL HISTORY OF COLONIC POLYPS: ICD-10-CM

## 2018-11-19 DIAGNOSIS — R13.12 DYSPHAGIA, OROPHARYNGEAL PHASE: ICD-10-CM

## 2018-11-19 DIAGNOSIS — K64.8 OTHER HEMORRHOIDS: ICD-10-CM

## 2018-11-19 DIAGNOSIS — K21.9 GASTRO-ESOPHAGEAL REFLUX DISEASE WITHOUT ESOPHAGITIS: ICD-10-CM

## 2018-11-19 DIAGNOSIS — K59.03 DRUG INDUCED CONSTIPATION: ICD-10-CM

## 2018-11-19 DIAGNOSIS — K44.9 DIAPHRAGMATIC HERNIA WITHOUT OBSTRUCTION OR GANGRENE: ICD-10-CM

## 2018-11-19 DIAGNOSIS — K57.30 DIVERTICULOSIS OF LARGE INTESTINE WITHOUT PERFORATION OR ABS: ICD-10-CM

## 2018-11-19 DIAGNOSIS — K29.70 GASTRITIS, UNSPECIFIED, WITHOUT BLEEDING: ICD-10-CM

## 2018-11-19 PROCEDURE — 99213 OFFICE O/P EST LOW 20 MIN: CPT | Performed by: INTERNAL MEDICINE

## 2019-01-08 ENCOUNTER — TELEPHONE (OUTPATIENT)
Dept: INTERNAL MEDICINE | Facility: CLINIC | Age: 69
End: 2019-01-08

## 2019-01-08 DIAGNOSIS — M62.838 MUSCLE SPASM: Primary | ICD-10-CM

## 2019-01-08 RX ORDER — CYCLOBENZAPRINE HCL 5 MG
5 TABLET ORAL 3 TIMES DAILY PRN
Qty: 30 TABLET | Refills: 0 | Status: SHIPPED | OUTPATIENT
Start: 2019-01-08 | End: 2019-05-14 | Stop reason: SDUPTHER

## 2019-01-08 NOTE — TELEPHONE ENCOUNTER
----- Message from Kerri JANELLE Simoneric sent at 1/7/2019 11:22 AM EST -----  Contact: pt - Dr Howell's pt - RE: Rx refill    Pt calling and would like a refill on Rx. She informs is going to therapy for knee and would like to take before therapy. Could you please call in Rx for 90 day if poss? Please advise. Thanks      cyclobenzaprine (FLEXERIL) 5 MG tablet 30 tablet    Sig - Route: Take 1 tablet by mouth 3 (Three) Times a Day As Needed for Muscle Spasms. - Oral     [facility] [address] AT [facility] [address] - [phone]  - [phone] FX      Pt # [phone]

## 2019-03-19 ENCOUNTER — TRANSCRIBE ORDERS (OUTPATIENT)
Dept: ADMINISTRATIVE | Facility: HOSPITAL | Age: 69
End: 2019-03-19

## 2019-03-19 DIAGNOSIS — M25.552 LEFT HIP PAIN: Primary | ICD-10-CM

## 2019-03-20 ENCOUNTER — OFFICE VISIT (OUTPATIENT)
Dept: CARDIOLOGY | Facility: CLINIC | Age: 69
End: 2019-03-20

## 2019-03-20 VITALS
DIASTOLIC BLOOD PRESSURE: 70 MMHG | HEIGHT: 71 IN | BODY MASS INDEX: 34.89 KG/M2 | WEIGHT: 249.2 LBS | SYSTOLIC BLOOD PRESSURE: 130 MMHG | HEART RATE: 76 BPM

## 2019-03-20 DIAGNOSIS — G47.33 OBSTRUCTIVE SLEEP APNEA SYNDROME: ICD-10-CM

## 2019-03-20 DIAGNOSIS — I10 ESSENTIAL HYPERTENSION: Primary | ICD-10-CM

## 2019-03-20 DIAGNOSIS — Q23.1 BICUSPID AORTIC VALVE: ICD-10-CM

## 2019-03-20 DIAGNOSIS — E78.49 OTHER HYPERLIPIDEMIA: ICD-10-CM

## 2019-03-20 DIAGNOSIS — R00.2 PALPITATIONS: ICD-10-CM

## 2019-03-20 PROCEDURE — 93000 ELECTROCARDIOGRAM COMPLETE: CPT | Performed by: INTERNAL MEDICINE

## 2019-03-20 PROCEDURE — 99214 OFFICE O/P EST MOD 30 MIN: CPT | Performed by: INTERNAL MEDICINE

## 2019-03-20 NOTE — PROGRESS NOTES
Date of Office Visit: 2019  Encounter Provider: Luanne Levin MD  Place of Service: [facility] CARDIOLOGY  Patient Name: Valery Zabala  :1950      Patient ID:  Valery Zabala is a 68 y.o. female is here for  followup for         History of Present Illness    She came in for chest pain.  She had a nonischemic stress nuclear perfusion study and normal echocardiogram done 2017     She has obstructive sleep apnea and uses a BiPAP for that.  She's had a history of rheumatic fever.  She has irritable bowel syndrome and gastroscope reflux disease.  She will of Protonix because of long-term issues with osteoporosis.  She takes Linzess for the irritable bowel syndrome.  She was taking Zantac but had some vomiting with this.  When she lost weight her GERD got better and the vomiting got better.        She does have constant knee pain for which she gets steroid injections because of the severe valgus abnormality.  She has rare alcohol and does not smoke.  She is  has 2 stepchildren.     Her father had coronary bypass grafting.  Her mother had arrhythmia.  Her maternal grandfather  myocardial infarction in his 70s.     She vascular screening done 2017 and this did show mild bilateral carotid disease.  Her abdominal aorta was normal and her peripheral vasculature was normal.    She is on Altace and Bystolic for hypertension.  She had a full carotid duplex dated on 2018 showing mild bilateral carotid artery stenosis.  I did pull up her echocardiographic images today from 2017.  Aortic valve looks like it is bicuspid with fusion of the non-and left coronary cusps.  It does not look stenotic.    She has occasional palpitations.  She has fleeting dyspnea at times.  This is been going on for years and does not seem to be getting worse.  She does have a lot of hip pain and uses a cane.  This is due to osteoarthritis.  She has had no syncope.  She has no  exertional chest tightness or pressure.  She has no orthopnea or PND.  She had some superficial thrombophlebitis over the last year but has had no DVTs.  She has been taking her medications as directed and her blood pressures been well controlled.    Past Medical History:   Diagnosis Date   • Atherosclerosis of both carotid arteries 05/21/2018    carotid duplex 6/2018: mild right proximal stenosis; left normal; plaque bilateral carotid bowel    • Essential hypertension    • Fatigue    • Frequent headaches    • GERD (gastroesophageal reflux disease)    • History of anemia    • Hx of colonic polyps    • Hyperlipidemia    • Hypertension    • Insomnia    • Irritable bowel syndrome with constipation    • Localized edema    • Multiple joint pain    • MARANDA (obstructive sleep apnea)     compliant with Bipap machine    • Palpitations    • Primary insomnia    • Rheumatic fever    • Sleep disturbance    • Vitamin D deficiency          Past Surgical History:   Procedure Laterality Date   • APPENDECTOMY     • ARTERIOVENOUS FISTULA REPAIR  1986   • EYE SURGERY     • HYSTERECTOMY  1994   • SHOULDER SURGERY  1997   • TONSILLECTOMY         Current Outpatient Medications on File Prior to Visit   Medication Sig Dispense Refill   • acyclovir (ZOVIRAX) 200 MG capsule TAKE 1 CAPSULE DAILY 90 capsule 2   • ALTACE 10 MG capsule TAKE 1 CAPSULE DAILY 90 capsule 3   • cholecalciferol (VITAMIN D3) 1000 UNITS tablet Take 2,000 Units by mouth daily.     • cyclobenzaprine (FLEXERIL) 5 MG tablet Take 1 tablet by mouth 3 (Three) Times a Day As Needed for Muscle Spasms. 30 tablet 0   • cycloSPORINE (RESTASIS) 0.05 % ophthalmic emulsion 1 drop 2 (two) times a day.     • docusate sodium (COLACE) 250 MG capsule Take 250 mg by mouth daily.     • doxepin (SINEquan) 25 MG capsule TAKE 1 CAPSULE DAILY 90 capsule 2   • fluticasone (FLONASE) 50 MCG/ACT nasal spray 2 sprays into each nostril Daily. Administer 2 sprays in each nostril for each dose. 1 bottle 12    • Linaclotide (LINZESS) 145 MCG capsule Take 1 capsule by mouth Daily.     • Loratadine (CLARITIN) 10 MG capsule Take 1 capsule by mouth Daily.     • naproxen sodium (ALEVE) 220 MG tablet Take 220 mg by mouth Daily.     • nebivolol (BYSTOLIC) 5 MG tablet Take 0.5 tablets by mouth Daily. 45 tablet 2   • omeprazole (priLOSEC) 20 MG capsule Take 20 mg by mouth Daily.       No current facility-administered medications on file prior to visit.        Social History     Socioeconomic History   • Marital status:      Spouse name: Not on file   • Number of children: Not on file   • Years of education: Not on file   • Highest education level: Not on file   Tobacco Use   • Smoking status: Former Smoker     Years: 1.00   • Smokeless tobacco: Never Used   • Tobacco comment: Daily caffeine use   Substance and Sexual Activity   • Alcohol use: Yes     Comment: social   • Drug use: No   • Sexual activity: No           Review of Systems   Constitution: Negative.   HENT: Negative for congestion.    Eyes: Negative for vision loss in left eye and vision loss in right eye.   Cardiovascular: Positive for leg swelling.   Respiratory: Negative.  Negative for cough, hemoptysis, shortness of breath, sleep disturbances due to breathing, snoring, sputum production and wheezing.    Endocrine: Negative.    Hematologic/Lymphatic: Negative.    Skin: Negative for poor wound healing and rash.   Musculoskeletal: Positive for joint pain. Negative for falls, gout, muscle cramps and myalgias.   Gastrointestinal: Negative for abdominal pain, diarrhea, dysphagia, hematemesis, melena, nausea and vomiting.   Neurological: Negative for excessive daytime sleepiness, dizziness, headaches, light-headedness, loss of balance, seizures and vertigo.   Psychiatric/Behavioral: Negative for depression and substance abuse. The patient is not nervous/anxious.        Procedures    ECG 12 Lead  Date/Time: 3/20/2019 10:19 AM  Performed by: Luanne Levin  "MD  Authorized by: Luanne Levin MD   Comparison: compared with previous ECG   Similar to previous ECG  Rhythm: sinus rhythm    Clinical impression: normal ECG                Objective:      Vitals:    03/20/19 1001   BP: 130/70   BP Location: Left arm   Patient Position: Sitting   Pulse: 76   Weight: 113 kg (249 lb 3.2 oz)   Height: 180.3 cm (71\")     Body mass index is 34.76 kg/m².    Physical Exam   Constitutional: She is oriented to person, place, and time. She appears well-developed and well-nourished. No distress.   HENT:   Head: Normocephalic and atraumatic.   Eyes: Conjunctivae are normal. No scleral icterus.   Neck: Neck supple. No JVD present. Carotid bruit is not present. No thyromegaly present.   Cardiovascular: Normal rate, regular rhythm, S1 normal, S2 normal and intact distal pulses.  No extrasystoles are present. PMI is not displaced. Exam reveals no gallop.   Murmur heard.   Midsystolic murmur is present with a grade of 2/6 at the upper right sternal border and upper left sternal border.  Pulses:       Carotid pulses are 2+ on the right side, and 2+ on the left side.       Radial pulses are 2+ on the right side, and 2+ on the left side.        Dorsalis pedis pulses are 2+ on the right side, and 2+ on the left side.        Posterior tibial pulses are 2+ on the right side, and 2+ on the left side.   Pulmonary/Chest: Effort normal and breath sounds normal. No respiratory distress. She has no wheezes. She has no rhonchi. She has no rales. She exhibits no tenderness.   Abdominal: Soft. Bowel sounds are normal. She exhibits no distension, no abdominal bruit and no mass. There is no tenderness.   Musculoskeletal: She exhibits no edema or deformity.   Lymphadenopathy:     She has no cervical adenopathy.   Neurological: She is alert and oriented to person, place, and time. No cranial nerve deficit.   Skin: Skin is warm and dry. No rash noted. She is not diaphoretic. No cyanosis. No pallor. Nails show " no clubbing.   Psychiatric: She has a normal mood and affect. Judgment normal.   Vitals reviewed.      Lab Review:       Assessment:      Diagnosis Plan   1. Essential hypertension     2. Other hyperlipidemia     3. Palpitations     4. Obstructive sleep apnea syndrome       1. Chest pain, normal stress nuclear study 8/2017.  No further pain.   2. Hypertension, well controlled.    3. Hyperlipidemia, untreated.   4. Normal EF on echo 8/2017 but aortic valve looks bicuspid.  5. Normal bilateral carotid stenosis, last carotid 6/2018.      Plan:       Repeat echo for bicuspid aortic valve and palpitations.  No med changes.  Doing well. See sheba in 1year.

## 2019-03-29 ENCOUNTER — HOSPITAL ENCOUNTER (OUTPATIENT)
Dept: CARDIOLOGY | Facility: HOSPITAL | Age: 69
Discharge: HOME | End: 2019-03-29
Admitting: INTERNAL MEDICINE

## 2019-03-29 VITALS — WEIGHT: 249 LBS | HEIGHT: 71 IN | BODY MASS INDEX: 34.86 KG/M2 | HEART RATE: 65 BPM

## 2019-03-29 DIAGNOSIS — Q23.1 BICUSPID AORTIC VALVE: ICD-10-CM

## 2019-03-29 DIAGNOSIS — I10 ESSENTIAL HYPERTENSION: ICD-10-CM

## 2019-03-29 LAB
ASCENDING AORTA: 2.8 CM
BH CV ECHO MEAS - ACS: 2 CM
BH CV ECHO MEAS - AO MAX PG (FULL): -0.13 MMHG
BH CV ECHO MEAS - AO MAX PG: 4.7 MMHG
BH CV ECHO MEAS - AO MEAN PG (FULL): -0.71 MMHG
BH CV ECHO MEAS - AO MEAN PG: 2.6 MMHG
BH CV ECHO MEAS - AO ROOT AREA (BSA CORRECTED): 1.2
BH CV ECHO MEAS - AO ROOT AREA: 6.2 CM^2
BH CV ECHO MEAS - AO ROOT DIAM: 2.8 CM
BH CV ECHO MEAS - AO V2 MAX: 108.6 CM/SEC
BH CV ECHO MEAS - AO V2 MEAN: 75.1 CM/SEC
BH CV ECHO MEAS - AO V2 VTI: 24.1 CM
BH CV ECHO MEAS - AVA(I,A): 3.3 CM^2
BH CV ECHO MEAS - AVA(I,D): 3.3 CM^2
BH CV ECHO MEAS - AVA(V,A): 2.8 CM^2
BH CV ECHO MEAS - AVA(V,D): 2.8 CM^2
BH CV ECHO MEAS - BSA(HAYCOCK): 2.4 M^2
BH CV ECHO MEAS - BSA: 2.3 M^2
BH CV ECHO MEAS - BZI_BMI: 33.5 KILOGRAMS/M^2
BH CV ECHO MEAS - BZI_METRIC_HEIGHT: 180.3 CM
BH CV ECHO MEAS - BZI_METRIC_WEIGHT: 108.9 KG
BH CV ECHO MEAS - EDV(MOD-SP2): 56 ML
BH CV ECHO MEAS - EDV(MOD-SP4): 62 ML
BH CV ECHO MEAS - EDV(TEICH): 149.4 ML
BH CV ECHO MEAS - EF(CUBED): 71.3 %
BH CV ECHO MEAS - EF(MOD-BP): 68 %
BH CV ECHO MEAS - EF(MOD-SP2): 69.6 %
BH CV ECHO MEAS - EF(MOD-SP4): 67.7 %
BH CV ECHO MEAS - EF(TEICH): 62.4 %
BH CV ECHO MEAS - ESV(MOD-SP2): 17 ML
BH CV ECHO MEAS - ESV(MOD-SP4): 20 ML
BH CV ECHO MEAS - ESV(TEICH): 56.2 ML
BH CV ECHO MEAS - FS: 34 %
BH CV ECHO MEAS - IVS/LVPW: 1
BH CV ECHO MEAS - IVSD: 1 CM
BH CV ECHO MEAS - LAT PEAK E' VEL: 8 CM/SEC
BH CV ECHO MEAS - LV DIASTOLIC VOL/BSA (35-75): 27.2 ML/M^2
BH CV ECHO MEAS - LV MASS(C)D: 209.4 GRAMS
BH CV ECHO MEAS - LV MASS(C)DI: 91.9 GRAMS/M^2
BH CV ECHO MEAS - LV MAX PG: 4.8 MMHG
BH CV ECHO MEAS - LV MEAN PG: 3.4 MMHG
BH CV ECHO MEAS - LV SYSTOLIC VOL/BSA (12-30): 8.8 ML/M^2
BH CV ECHO MEAS - LV V1 MAX: 110.1 CM/SEC
BH CV ECHO MEAS - LV V1 MEAN: 87.7 CM/SEC
BH CV ECHO MEAS - LV V1 VTI: 29 CM
BH CV ECHO MEAS - LVIDD: 5.5 CM
BH CV ECHO MEAS - LVIDS: 3.6 CM
BH CV ECHO MEAS - LVLD AP2: 6.7 CM
BH CV ECHO MEAS - LVLD AP4: 7.2 CM
BH CV ECHO MEAS - LVLS AP2: 5.5 CM
BH CV ECHO MEAS - LVLS AP4: 5.7 CM
BH CV ECHO MEAS - LVOT AREA (M): 2.8 CM^2
BH CV ECHO MEAS - LVOT AREA: 2.7 CM^2
BH CV ECHO MEAS - LVOT DIAM: 1.9 CM
BH CV ECHO MEAS - LVPWD: 0.96 CM
BH CV ECHO MEAS - MED PEAK E' VEL: 9 CM/SEC
BH CV ECHO MEAS - MV A DUR: 0.12 SEC
BH CV ECHO MEAS - MV A MAX VEL: 91.7 CM/SEC
BH CV ECHO MEAS - MV DEC SLOPE: 487.5 CM/SEC^2
BH CV ECHO MEAS - MV DEC TIME: 0.19 SEC
BH CV ECHO MEAS - MV E MAX VEL: 94.6 CM/SEC
BH CV ECHO MEAS - MV E/A: 1
BH CV ECHO MEAS - MV MAX PG: 3.5 MMHG
BH CV ECHO MEAS - MV MEAN PG: 1.4 MMHG
BH CV ECHO MEAS - MV P1/2T MAX VEL: 86.2 CM/SEC
BH CV ECHO MEAS - MV P1/2T: 51.8 MSEC
BH CV ECHO MEAS - MV V2 MAX: 93.7 CM/SEC
BH CV ECHO MEAS - MV V2 MEAN: 49.1 CM/SEC
BH CV ECHO MEAS - MV V2 VTI: 24 CM
BH CV ECHO MEAS - MVA P1/2T LCG: 2.6 CM^2
BH CV ECHO MEAS - MVA(P1/2T): 4.2 CM^2
BH CV ECHO MEAS - MVA(VTI): 3.3 CM^2
BH CV ECHO MEAS - PA MAX PG (FULL): 0.96 MMHG
BH CV ECHO MEAS - PA MAX PG: 3 MMHG
BH CV ECHO MEAS - PA V2 MAX: 87.3 CM/SEC
BH CV ECHO MEAS - PULM A REVS DUR: 0.14 SEC
BH CV ECHO MEAS - PULM A REVS VEL: 24.7 CM/SEC
BH CV ECHO MEAS - PULM DIAS VEL: 40.7 CM/SEC
BH CV ECHO MEAS - PULM S/D: 1.7
BH CV ECHO MEAS - PULM SYS VEL: 68.9 CM/SEC
BH CV ECHO MEAS - PVA(V,A): 2.6 CM^2
BH CV ECHO MEAS - PVA(V,D): 2.6 CM^2
BH CV ECHO MEAS - QP/QS: 0.7
BH CV ECHO MEAS - RAP SYSTOLE: 3 MMHG
BH CV ECHO MEAS - RV MAX PG: 2.1 MMHG
BH CV ECHO MEAS - RV MEAN PG: 1.1 MMHG
BH CV ECHO MEAS - RV V1 MAX: 72.3 CM/SEC
BH CV ECHO MEAS - RV V1 MEAN: 48.8 CM/SEC
BH CV ECHO MEAS - RV V1 VTI: 17.5 CM
BH CV ECHO MEAS - RVOT AREA: 3.2 CM^2
BH CV ECHO MEAS - RVOT DIAM: 2 CM
BH CV ECHO MEAS - RVSP: 27 MMHG
BH CV ECHO MEAS - SI(AO): 66 ML/M^2
BH CV ECHO MEAS - SI(CUBED): 52.9 ML/M^2
BH CV ECHO MEAS - SI(LVOT): 35 ML/M^2
BH CV ECHO MEAS - SI(MOD-SP2): 17.1 ML/M^2
BH CV ECHO MEAS - SI(MOD-SP4): 18.4 ML/M^2
BH CV ECHO MEAS - SI(TEICH): 40.9 ML/M^2
BH CV ECHO MEAS - SUP REN AO DIAM: 2 CM
BH CV ECHO MEAS - SV(AO): 150.3 ML
BH CV ECHO MEAS - SV(CUBED): 120.6 ML
BH CV ECHO MEAS - SV(LVOT): 79.7 ML
BH CV ECHO MEAS - SV(MOD-SP2): 39 ML
BH CV ECHO MEAS - SV(MOD-SP4): 42 ML
BH CV ECHO MEAS - SV(RVOT): 55.7 ML
BH CV ECHO MEAS - SV(TEICH): 93.1 ML
BH CV ECHO MEAS - TAPSE (>1.6): 2 CM2
BH CV ECHO MEAS - TR MAX VEL: 247 CM/SEC
BH CV ECHO MEASUREMENTS AVERAGE E/E' RATIO: 11.13
BH CV XLRA - RV BASE: 3 CM
BH CV XLRA - TDI S': 12 CM/SEC
LEFT ATRIUM VOLUME INDEX: 20 ML/M2
LEFT ATRIUM VOLUME: 47 CM3
LV EF 2D ECHO EST: 68 %
MAXIMAL PREDICTED HEART RATE: 152 BPM
SINUS: 2.7 CM
STJ: 2.9 CM
STRESS TARGET HR: 129 BPM

## 2019-03-29 PROCEDURE — 93306 TTE W/DOPPLER COMPLETE: CPT | Performed by: INTERNAL MEDICINE

## 2019-03-29 PROCEDURE — 93306 TTE W/DOPPLER COMPLETE: CPT

## 2019-04-01 ENCOUNTER — TELEPHONE (OUTPATIENT)
Dept: CARDIOLOGY | Facility: CLINIC | Age: 69
End: 2019-04-01

## 2019-04-08 ENCOUNTER — HOSPITAL ENCOUNTER (OUTPATIENT)
Dept: GENERAL RADIOLOGY | Facility: HOSPITAL | Age: 69
Discharge: HOME | End: 2019-04-08
Admitting: SPECIALIST

## 2019-04-08 ENCOUNTER — HOSPITAL ENCOUNTER (OUTPATIENT)
Dept: MRI IMAGING | Facility: HOSPITAL | Age: 69
Discharge: HOME | End: 2019-04-08

## 2019-04-08 DIAGNOSIS — M25.552 LEFT HIP PAIN: ICD-10-CM

## 2019-04-08 PROCEDURE — 0 GADOBENATE DIMEGLUMINE 529 MG/ML SOLUTION: Performed by: RADIOLOGY

## 2019-04-08 PROCEDURE — 77002 NEEDLE LOCALIZATION BY XRAY: CPT

## 2019-04-08 PROCEDURE — A9577 INJ MULTIHANCE: HCPCS | Performed by: RADIOLOGY

## 2019-04-08 PROCEDURE — 25010000002 METHYLPREDNISOLONE PER 40 MG: Performed by: RADIOLOGY

## 2019-04-08 PROCEDURE — 25010000002 IOPAMIDOL 61 % SOLUTION: Performed by: RADIOLOGY

## 2019-04-08 PROCEDURE — 73722 MRI JOINT OF LWR EXTR W/DYE: CPT

## 2019-04-08 RX ORDER — METHYLPREDNISOLONE ACETATE 40 MG/ML
40 INJECTION, SUSPENSION INTRA-ARTICULAR; INTRALESIONAL; INTRAMUSCULAR; SOFT TISSUE ONCE
Status: COMPLETED | OUTPATIENT
Start: 2019-04-08 | End: 2019-04-08

## 2019-04-08 RX ORDER — LIDOCAINE HYDROCHLORIDE 10 MG/ML
10 INJECTION, SOLUTION INFILTRATION; PERINEURAL ONCE
Status: COMPLETED | OUTPATIENT
Start: 2019-04-08 | End: 2019-04-08

## 2019-04-08 RX ORDER — BUPIVACAINE HYDROCHLORIDE 2.5 MG/ML
20 INJECTION, SOLUTION EPIDURAL; INFILTRATION; INTRACAUDAL ONCE
Status: COMPLETED | OUTPATIENT
Start: 2019-04-08 | End: 2019-04-08

## 2019-04-08 RX ADMIN — GADOBENATE DIMEGLUMINE 0.01 ML: 529 INJECTION, SOLUTION INTRAVENOUS at 11:04

## 2019-04-08 RX ADMIN — LIDOCAINE HYDROCHLORIDE 5 ML: 10 INJECTION, SOLUTION INFILTRATION; PERINEURAL at 11:05

## 2019-04-08 RX ADMIN — IOPAMIDOL 4 ML: 612 INJECTION, SOLUTION INTRAVENOUS at 11:04

## 2019-04-08 RX ADMIN — BUPIVACAINE HYDROCHLORIDE 4 ML: 2.5 INJECTION, SOLUTION EPIDURAL; INFILTRATION; INTRACAUDAL; PERINEURAL at 11:04

## 2019-04-08 RX ADMIN — METHYLPREDNISOLONE ACETATE 40 MG: 40 INJECTION, SUSPENSION INTRA-ARTICULAR; INTRALESIONAL; INTRAMUSCULAR; SOFT TISSUE at 11:05

## 2019-04-17 RX ORDER — ACYCLOVIR 200 MG/1
CAPSULE ORAL
Qty: 90 CAPSULE | Refills: 2 | Status: SHIPPED | OUTPATIENT
Start: 2019-04-17 | End: 2019-06-26 | Stop reason: SDUPTHER

## 2019-04-17 RX ORDER — DOXEPIN HYDROCHLORIDE 25 MG/1
CAPSULE ORAL
Qty: 90 CAPSULE | Refills: 2 | Status: SHIPPED | OUTPATIENT
Start: 2019-04-17 | End: 2019-05-14

## 2019-05-14 ENCOUNTER — TELEPHONE (OUTPATIENT)
Dept: INTERNAL MEDICINE | Facility: CLINIC | Age: 69
End: 2019-05-14

## 2019-05-14 ENCOUNTER — OFFICE VISIT (OUTPATIENT)
Dept: INTERNAL MEDICINE | Facility: CLINIC | Age: 69
End: 2019-05-14

## 2019-05-14 VITALS
BODY MASS INDEX: 35.14 KG/M2 | SYSTOLIC BLOOD PRESSURE: 128 MMHG | DIASTOLIC BLOOD PRESSURE: 74 MMHG | WEIGHT: 251 LBS | HEIGHT: 71 IN

## 2019-05-14 DIAGNOSIS — M62.838 MUSCLE SPASM: ICD-10-CM

## 2019-05-14 DIAGNOSIS — E78.49 OTHER HYPERLIPIDEMIA: ICD-10-CM

## 2019-05-14 DIAGNOSIS — G89.29 CHRONIC PAIN OF BOTH KNEES: ICD-10-CM

## 2019-05-14 DIAGNOSIS — M25.561 CHRONIC PAIN OF BOTH KNEES: ICD-10-CM

## 2019-05-14 DIAGNOSIS — R25.2 LEG CRAMPS: ICD-10-CM

## 2019-05-14 DIAGNOSIS — G89.29 CHRONIC LEFT HIP PAIN: ICD-10-CM

## 2019-05-14 DIAGNOSIS — R73.09 ELEVATED GLUCOSE: ICD-10-CM

## 2019-05-14 DIAGNOSIS — M25.552 CHRONIC LEFT HIP PAIN: ICD-10-CM

## 2019-05-14 DIAGNOSIS — M25.50 MULTIPLE JOINT PAIN: ICD-10-CM

## 2019-05-14 DIAGNOSIS — I10 ESSENTIAL HYPERTENSION: Primary | ICD-10-CM

## 2019-05-14 DIAGNOSIS — M25.562 CHRONIC PAIN OF BOTH KNEES: ICD-10-CM

## 2019-05-14 DIAGNOSIS — R53.82 CHRONIC FATIGUE: ICD-10-CM

## 2019-05-14 DIAGNOSIS — M62.838 NECK MUSCLE SPASM: ICD-10-CM

## 2019-05-14 LAB
ALBUMIN SERPL-MCNC: 3.8 G/DL (ref 3.5–5.2)
ALBUMIN/GLOB SERPL: 1.1 G/DL
ALP SERPL-CCNC: 83 U/L (ref 39–117)
ALT SERPL W P-5'-P-CCNC: 19 U/L (ref 1–33)
ANION GAP SERPL CALCULATED.3IONS-SCNC: 11.7 MMOL/L
AST SERPL-CCNC: 19 U/L (ref 1–32)
BASOPHILS # BLD AUTO: 0.03 10*3/MM3 (ref 0–0.2)
BASOPHILS NFR BLD AUTO: 0.4 % (ref 0–1.5)
BILIRUB SERPL-MCNC: 0.3 MG/DL (ref 0.2–1.2)
BILIRUB UR QL STRIP: NEGATIVE
BUN BLD-MCNC: 17 MG/DL (ref 8–23)
BUN/CREAT SERPL: 19.1 (ref 7–25)
CALCIUM SPEC-SCNC: 9.7 MG/DL (ref 8.6–10.5)
CHLORIDE SERPL-SCNC: 99 MMOL/L (ref 98–107)
CHOLEST SERPL-MCNC: 186 MG/DL (ref 0–200)
CLARITY UR: ABNORMAL
CO2 SERPL-SCNC: 25.3 MMOL/L (ref 22–29)
COLOR UR: YELLOW
CREAT BLD-MCNC: 0.89 MG/DL (ref 0.57–1)
DEPRECATED RDW RBC AUTO: 49.7 FL (ref 37–54)
EOSINOPHIL # BLD AUTO: 0.03 10*3/MM3 (ref 0–0.4)
EOSINOPHIL NFR BLD AUTO: 0.4 % (ref 0.3–6.2)
ERYTHROCYTE [DISTWIDTH] IN BLOOD BY AUTOMATED COUNT: 15 % (ref 12.3–15.4)
GFR SERPL CREATININE-BSD FRML MDRD: 63 ML/MIN/1.73
GLOBULIN UR ELPH-MCNC: 3.5 GM/DL
GLUCOSE BLD-MCNC: 89 MG/DL (ref 65–99)
GLUCOSE UR STRIP-MCNC: NEGATIVE MG/DL
HBA1C MFR BLD: 5.2 % (ref 4.8–5.6)
HCT VFR BLD AUTO: 37.8 % (ref 34–46.6)
HDLC SERPL-MCNC: 47 MG/DL (ref 40–60)
HGB BLD-MCNC: 12.2 G/DL (ref 12–15.9)
HGB UR QL STRIP.AUTO: NEGATIVE
KETONES UR QL STRIP: NEGATIVE
LDLC SERPL CALC-MCNC: 121 MG/DL (ref 0–100)
LDLC/HDLC SERPL: 2.57 {RATIO}
LEUKOCYTE ESTERASE UR QL STRIP.AUTO: NEGATIVE
LYMPHOCYTES # BLD AUTO: 2.35 10*3/MM3 (ref 0.7–3.1)
LYMPHOCYTES NFR BLD AUTO: 28.2 % (ref 19.6–45.3)
MCH RBC QN AUTO: 30.1 PG (ref 26.6–33)
MCHC RBC AUTO-ENTMCNC: 32.3 G/DL (ref 31.5–35.7)
MCV RBC AUTO: 93.3 FL (ref 79–97)
MONOCYTES # BLD AUTO: 0.61 10*3/MM3 (ref 0.1–0.9)
MONOCYTES NFR BLD AUTO: 7.3 % (ref 5–12)
NEUTROPHILS # BLD AUTO: 5.32 10*3/MM3 (ref 1.7–7)
NEUTROPHILS NFR BLD AUTO: 63.7 % (ref 42.7–76)
NITRITE UR QL STRIP: NEGATIVE
PH UR STRIP.AUTO: 5.5 [PH] (ref 5–8)
PLATELET # BLD AUTO: 267 10*3/MM3 (ref 140–450)
PMV BLD AUTO: 10.2 FL (ref 6–12)
POTASSIUM BLD-SCNC: 3.9 MMOL/L (ref 3.5–5.2)
PROT SERPL-MCNC: 7.3 G/DL (ref 6–8.5)
PROT UR QL STRIP: NEGATIVE
RBC # BLD AUTO: 4.05 10*6/MM3 (ref 3.77–5.28)
SODIUM BLD-SCNC: 136 MMOL/L (ref 136–145)
SP GR UR STRIP: <=1.005 (ref 1–1.03)
TRIGL SERPL-MCNC: 90 MG/DL (ref 0–150)
UROBILINOGEN UR QL STRIP: ABNORMAL
VLDLC SERPL-MCNC: 18 MG/DL (ref 5–40)
WBC NRBC COR # BLD: 8.34 10*3/MM3 (ref 3.4–10.8)

## 2019-05-14 PROCEDURE — 80061 LIPID PANEL: CPT | Performed by: INTERNAL MEDICINE

## 2019-05-14 PROCEDURE — 81003 URINALYSIS AUTO W/O SCOPE: CPT | Performed by: INTERNAL MEDICINE

## 2019-05-14 PROCEDURE — 85025 COMPLETE CBC W/AUTO DIFF WBC: CPT | Performed by: INTERNAL MEDICINE

## 2019-05-14 PROCEDURE — 80053 COMPREHEN METABOLIC PANEL: CPT | Performed by: INTERNAL MEDICINE

## 2019-05-14 PROCEDURE — 99214 OFFICE O/P EST MOD 30 MIN: CPT | Performed by: INTERNAL MEDICINE

## 2019-05-14 RX ORDER — CYCLOBENZAPRINE HCL 5 MG
5 TABLET ORAL 3 TIMES DAILY PRN
Qty: 30 TABLET | Refills: 5 | Status: SHIPPED | OUTPATIENT
Start: 2019-05-14 | End: 2019-05-14

## 2019-05-14 RX ORDER — BACLOFEN 10 MG/1
10 TABLET ORAL 3 TIMES DAILY
Qty: 30 TABLET | Refills: 5 | Status: SHIPPED | OUTPATIENT
Start: 2019-05-14 | End: 2020-06-24

## 2019-05-14 NOTE — TELEPHONE ENCOUNTER
I spoke to the pharmacist and told him per Dr. Howell that she is canceling the Flexeril 5 mg and to only fill the Baclofen 10mg.

## 2019-05-14 NOTE — TELEPHONE ENCOUNTER
----- Message from Chrissy Garcia sent at 5/14/2019  1:20 PM EDT -----  Contact: [facility]  [facility] called to alert us of interaction between     cyclobenzaprine (FLEXERIL) 5 MG tablet and  baclofen (LIORESAL) 10 MG tablet    Please advise.      [facility] [address] [place] [address] RD. - [phone]  - [phone] FX

## 2019-05-14 NOTE — PROGRESS NOTES
"Subjective   Valery Zabala is a 68 y.o. female here for   Chief Complaint   Patient presents with   • Hyperlipidemia     6 month follow-up   • Hypertension   • Insomnia   • Hip Pain     Left   .    Vitals:    05/14/19 0841   BP: 128/74   BP Location: Right arm   Patient Position: Sitting   Cuff Size: Adult   Weight: 114 kg (251 lb)   Height: 179.7 cm (70.75\")       Body mass index is 35.26 kg/m².    Hyperlipidemia   This is a chronic problem. The current episode started more than 1 year ago. The problem is controlled. Recent lipid tests were reviewed and are normal. She has no history of diabetes. Associated symptoms include myalgias (leg cramps). Pertinent negatives include no chest pain or shortness of breath.   Hypertension   This is a chronic problem. The current episode started more than 1 year ago. The problem is unchanged. The problem is controlled. Associated symptoms include neck pain. Pertinent negatives include no chest pain, palpitations or shortness of breath.   Insomnia   This is a chronic problem. The current episode started more than 1 year ago. The problem occurs constantly. The problem has been gradually worsening. Associated symptoms include arthralgias (left hip & both knees), myalgias (leg cramps) and neck pain. Pertinent negatives include no chest pain, chills, coughing, fatigue or fever.        The following portions of the patient's history were reviewed and updated as appropriate: allergies, current medications, past social history and problem list.    Review of Systems   Constitutional: Negative for chills, fatigue and fever.   Respiratory: Negative for cough, shortness of breath and wheezing.    Cardiovascular: Negative for chest pain, palpitations and leg swelling.   Musculoskeletal: Positive for arthralgias (left hip & both knees), myalgias (leg cramps), neck pain and neck stiffness.   Psychiatric/Behavioral: Positive for sleep disturbance (b/c left hip pain). Negative for dysphoric " mood. The patient has insomnia. The patient is not nervous/anxious.        Objective   Physical Exam   Constitutional: She appears well-developed and well-nourished. No distress.   Cardiovascular: Normal rate, regular rhythm and normal heart sounds.   Pulmonary/Chest: No respiratory distress. She has no wheezes. She has no rales. She exhibits no tenderness.   Musculoskeletal: She exhibits no edema.   Psychiatric: She has a normal mood and affect. Her behavior is normal.   Nursing note and vitals reviewed.      Assessment/Plan   Diagnoses and all orders for this visit:    Essential hypertension  Comments:  controlled - call if bp over 140/90  Orders:  -     CBC Auto Differential; Future  -     Comprehensive Metabolic Panel; Future  -     Lipid Panel; Future  -     Urinalysis With Microscopic If Indicated (No Culture) - Urine, Clean Catch; Future    Other hyperlipidemia  Comments:  need diet/ex  Orders:  -     Comprehensive Metabolic Panel; Future  -     Lipid Panel; Future    Chronic pain of both knees  Comments:  she will f/u with ortho (will get replacements if safe ab found)    Multiple joint pain    Chronic left hip pain  Comments:  f/u with ortho - may need replacement    Chronic fatigue  Comments:  need labs  Orders:  -     TSH Rfx On Abnormal To Free T4; Future  -     Vitamin B12 & Folate; Future    Elevated glucose  Comments:  need low sugar/carb diet  Orders:  -     Hemoglobin A1c; Future    Muscle spasm  -     cyclobenzaprine (FLEXERIL) 5 MG tablet; Take 1 tablet by mouth 3 (Three) Times a Day As Needed for Muscle Spasms.    Leg cramps  Comments:  trial of baclofen prn - sleepiness precautions given    Neck muscle spasm  Comments:  need heat,massage, stretches,etc - ok to use baclofen prn  Orders:  -     cyclobenzaprine (FLEXERIL) 5 MG tablet; Take 1 tablet by mouth 3 (Three) Times a Day As Needed for Muscle Spasms.  -     baclofen (LIORESAL) 10 MG tablet; Take 1 tablet by mouth 3 (Three) Times a Day. For  muscle spasm

## 2019-05-15 LAB
FOLATE SERPL-MCNC: 10.5 NG/ML (ref 4.78–24.2)
TSH SERPL-ACNC: 2.92 MIU/ML (ref 0.27–4.2)
VIT B12 SERPL-MCNC: 397 PG/ML (ref 211–946)

## 2019-05-20 ENCOUNTER — OFFICE (AMBULATORY)
Dept: URBAN - METROPOLITAN AREA CLINIC 75 | Facility: CLINIC | Age: 69
End: 2019-05-20

## 2019-05-20 VITALS
HEART RATE: 81 BPM | WEIGHT: 250 LBS | RESPIRATION RATE: 16 BRPM | HEIGHT: 72 IN | SYSTOLIC BLOOD PRESSURE: 128 MMHG | DIASTOLIC BLOOD PRESSURE: 84 MMHG

## 2019-05-20 DIAGNOSIS — R13.12 DYSPHAGIA, OROPHARYNGEAL PHASE: ICD-10-CM

## 2019-05-20 DIAGNOSIS — K57.30 DIVERTICULOSIS OF LARGE INTESTINE WITHOUT PERFORATION OR ABS: ICD-10-CM

## 2019-05-20 DIAGNOSIS — T50.905A ADVERSE EFFECT OF UNSPECIFIED DRUGS, MEDICAMENTS AND BIOLOGI: ICD-10-CM

## 2019-05-20 DIAGNOSIS — Z12.11 ENCOUNTER FOR SCREENING FOR MALIGNANT NEOPLASM OF COLON: ICD-10-CM

## 2019-05-20 DIAGNOSIS — K64.8 OTHER HEMORRHOIDS: ICD-10-CM

## 2019-05-20 DIAGNOSIS — K29.70 GASTRITIS, UNSPECIFIED, WITHOUT BLEEDING: ICD-10-CM

## 2019-05-20 DIAGNOSIS — K21.9 GASTRO-ESOPHAGEAL REFLUX DISEASE WITHOUT ESOPHAGITIS: ICD-10-CM

## 2019-05-20 DIAGNOSIS — K64.4 RESIDUAL HEMORRHOIDAL SKIN TAGS: ICD-10-CM

## 2019-05-20 DIAGNOSIS — Z86.010 PERSONAL HISTORY OF COLONIC POLYPS: ICD-10-CM

## 2019-05-20 DIAGNOSIS — K44.9 DIAPHRAGMATIC HERNIA WITHOUT OBSTRUCTION OR GANGRENE: ICD-10-CM

## 2019-05-20 PROCEDURE — 99213 OFFICE O/P EST LOW 20 MIN: CPT | Performed by: INTERNAL MEDICINE

## 2019-05-31 ENCOUNTER — OFFICE VISIT (OUTPATIENT)
Dept: INTERNAL MEDICINE | Facility: CLINIC | Age: 69
End: 2019-05-31

## 2019-05-31 VITALS
WEIGHT: 249 LBS | SYSTOLIC BLOOD PRESSURE: 140 MMHG | HEART RATE: 85 BPM | BODY MASS INDEX: 34.86 KG/M2 | OXYGEN SATURATION: 99 % | DIASTOLIC BLOOD PRESSURE: 90 MMHG | HEIGHT: 71 IN

## 2019-05-31 DIAGNOSIS — N73.9 PELVIC ABSCESS IN FEMALE: Primary | ICD-10-CM

## 2019-05-31 PROCEDURE — 99213 OFFICE O/P EST LOW 20 MIN: CPT | Performed by: NURSE PRACTITIONER

## 2019-05-31 RX ORDER — CLINDAMYCIN HYDROCHLORIDE 300 MG/1
300 CAPSULE ORAL 3 TIMES DAILY
Qty: 21 CAPSULE | Refills: 0 | Status: SHIPPED | OUTPATIENT
Start: 2019-05-31 | End: 2019-06-07

## 2019-05-31 NOTE — PROGRESS NOTES
Subjective   Valery Zabala is a 68 y.o. female.     She presents with a cyst that is red to her labia x 3 days. It is painful but she denies drainage. She has h/o of sebaceous cyst but not in the same area.      Cyst   Pertinent negatives include no abdominal pain, chills, fatigue, fever, nausea, numbness, rash, urinary symptoms (she does have minimal bladder leakage) or vomiting. She has tried nothing for the symptoms.        The following portions of the patient's history were reviewed and updated as appropriate: allergies, current medications, past family history, past medical history, past social history, past surgical history and problem list.    Review of Systems   Constitutional: Negative for activity change, chills, fatigue and fever.   Gastrointestinal: Negative for abdominal pain, nausea and vomiting.   Genitourinary: Negative for difficulty urinating.   Skin: Negative for rash and wound.   Neurological: Negative for numbness.       Objective   Physical Exam   Constitutional: She appears well-developed and well-nourished.   HENT:   Head: Normocephalic and atraumatic.   Cardiovascular: Normal rate, regular rhythm and normal heart sounds.   No murmur heard.  Pulmonary/Chest: Effort normal and breath sounds normal. No respiratory distress.   Musculoskeletal: Normal range of motion.   Neurological: She is alert.   Skin: Skin is warm and dry.   2 cm erythemtous abscess to L labia majora. Tender upon palpation. No eruption or drainage noted.   Psychiatric: She has a normal mood and affect. Her behavior is normal. Judgment and thought content normal.   Vitals reviewed.      Assessment/Plan   Valery was seen today for cyst.    Diagnoses and all orders for this visit:    Pelvic abscess in female  -     clindamycin (CLEOCIN) 300 MG capsule; Take 1 capsule by mouth 3 (Three) Times a Day for 7 days.    Due to multiple abx allergies clindamycin was chosen due to pt reports of previous tolerance.    Make sure to keep  are clean and dry. Can use topical abx and peroxide topically.    Return for worsening of sx.

## 2019-06-19 DIAGNOSIS — J30.2 OTHER SEASONAL ALLERGIC RHINITIS: ICD-10-CM

## 2019-06-19 RX ORDER — NEBIVOLOL HYDROCHLORIDE 5 MG/1
TABLET ORAL
Qty: 45 TABLET | Refills: 2 | Status: SHIPPED | OUTPATIENT
Start: 2019-06-19 | End: 2020-03-11

## 2019-06-19 RX ORDER — FLUTICASONE PROPIONATE 50 MCG
SPRAY, SUSPENSION (ML) NASAL
Qty: 48 G | Refills: 2 | Status: SHIPPED | OUTPATIENT
Start: 2019-06-19

## 2019-06-26 RX ORDER — ACYCLOVIR 200 MG/1
400 CAPSULE ORAL DAILY
Qty: 180 CAPSULE | Refills: 0 | Status: SHIPPED | OUTPATIENT
Start: 2019-06-26 | End: 2019-09-10 | Stop reason: SDUPTHER

## 2019-07-31 ENCOUNTER — OFFICE VISIT (OUTPATIENT)
Dept: INTERNAL MEDICINE | Facility: CLINIC | Age: 69
End: 2019-07-31

## 2019-07-31 VITALS
SYSTOLIC BLOOD PRESSURE: 124 MMHG | OXYGEN SATURATION: 99 % | WEIGHT: 254.2 LBS | HEIGHT: 71 IN | HEART RATE: 70 BPM | DIASTOLIC BLOOD PRESSURE: 78 MMHG | BODY MASS INDEX: 35.59 KG/M2

## 2019-07-31 DIAGNOSIS — Z96.642 STATUS POST LEFT HIP REPLACEMENT: Primary | ICD-10-CM

## 2019-07-31 DIAGNOSIS — R74.8 ELEVATED CK: ICD-10-CM

## 2019-07-31 PROBLEM — F41.9 ANXIETY AND DEPRESSION: Status: ACTIVE | Noted: 2019-07-31

## 2019-07-31 PROBLEM — M16.12 PRIMARY OSTEOARTHRITIS OF LEFT HIP: Status: ACTIVE | Noted: 2019-05-24

## 2019-07-31 PROBLEM — F32.A ANXIETY AND DEPRESSION: Status: ACTIVE | Noted: 2019-07-31

## 2019-07-31 LAB
ANION GAP SERPL CALCULATED.3IONS-SCNC: 12.5 MMOL/L (ref 5–15)
BUN BLD-MCNC: 18 MG/DL (ref 8–23)
BUN/CREAT SERPL: 16.1 (ref 7–25)
CALCIUM SPEC-SCNC: 9.6 MG/DL (ref 8.6–10.5)
CHLORIDE SERPL-SCNC: 101 MMOL/L (ref 98–107)
CO2 SERPL-SCNC: 25.5 MMOL/L (ref 22–29)
CREAT BLD-MCNC: 1.12 MG/DL (ref 0.57–1)
DEPRECATED RDW RBC AUTO: 50.8 FL (ref 37–54)
ERYTHROCYTE [DISTWIDTH] IN BLOOD BY AUTOMATED COUNT: 15.4 % (ref 12.3–15.4)
GFR SERPL CREATININE-BSD FRML MDRD: 48 ML/MIN/1.73
GLUCOSE BLD-MCNC: 97 MG/DL (ref 65–99)
HCT VFR BLD AUTO: 32.1 % (ref 34–46.6)
HGB BLD-MCNC: 10.2 G/DL (ref 12–15.9)
MCH RBC QN AUTO: 30 PG (ref 26.6–33)
MCHC RBC AUTO-ENTMCNC: 31.8 G/DL (ref 31.5–35.7)
MCV RBC AUTO: 94.4 FL (ref 79–97)
PLATELET # BLD AUTO: 414 10*3/MM3 (ref 140–450)
PMV BLD AUTO: 9.7 FL (ref 6–12)
POTASSIUM BLD-SCNC: 4.1 MMOL/L (ref 3.5–5.2)
RBC # BLD AUTO: 3.4 10*6/MM3 (ref 3.77–5.28)
SODIUM BLD-SCNC: 139 MMOL/L (ref 136–145)
WBC NRBC COR # BLD: 9.14 10*3/MM3 (ref 3.4–10.8)

## 2019-07-31 PROCEDURE — 99213 OFFICE O/P EST LOW 20 MIN: CPT | Performed by: NURSE PRACTITIONER

## 2019-07-31 PROCEDURE — 85027 COMPLETE CBC AUTOMATED: CPT | Performed by: NURSE PRACTITIONER

## 2019-07-31 PROCEDURE — 80048 BASIC METABOLIC PNL TOTAL CA: CPT | Performed by: NURSE PRACTITIONER

## 2019-07-31 RX ORDER — ASPIRIN 81 MG/1
81 TABLET ORAL
COMMUNITY
Start: 2019-07-18 | End: 2019-08-22

## 2019-07-31 RX ORDER — DOXEPIN HYDROCHLORIDE 25 MG/1
CAPSULE ORAL
COMMUNITY
Start: 2019-06-26 | End: 2019-12-27

## 2019-07-31 RX ORDER — AMOXICILLIN 250 MG
2 CAPSULE ORAL
COMMUNITY
Start: 2019-07-18 | End: 2019-08-28 | Stop reason: SDUPTHER

## 2019-07-31 NOTE — PROGRESS NOTES
Subjective   Valery Zabala is a 69 y.o. female.     She had left hip surgery on 7/19/2019. No fever or chills. She is current with home therapy (PT three times week)-[facility]. She is been doing home exercises. She had lab work during surgery and CK levels elevated (495). She is need to have that rechecked today.       Hip Pain    The incident occurred more than 1 week ago. The pain is present in the left hip. The quality of the pain is described as aching. The pain is at a severity of 2/10. The pain is mild. Pertinent negatives include no muscle weakness, numbness or tingling. Treatments tried: home exercises, tylenol, ice  The treatment provided mild relief.        The following portions of the patient's history were reviewed and updated as appropriate: allergies, current medications, past social history and problem list.    Review of Systems   Constitutional: Negative for activity change, appetite change, fatigue and fever.   Respiratory: Negative for cough, shortness of breath and wheezing.    Cardiovascular: Negative for chest pain, palpitations and leg swelling.   Musculoskeletal: Positive for arthralgias (left hip, s/p surgery) and joint swelling (left hip ).   Neurological: Negative for tingling and numbness.       Objective   Physical Exam   Constitutional: She is oriented to person, place, and time. She appears well-developed and well-nourished.   HENT:   Head: Normocephalic.   Nose: Nose normal.   Cardiovascular: Regular rhythm and normal heart sounds. Exam reveals no S3 and no S4.   No murmur heard.  Pulmonary/Chest: Effort normal and breath sounds normal. She has no decreased breath sounds. She has no wheezes. She has no rhonchi. She has no rales.   Musculoskeletal: She exhibits no edema.        Right hip: She exhibits normal range of motion, normal strength, no tenderness and no bony tenderness.        Left hip: She exhibits decreased range of motion, tenderness and swelling (trace).   No lower extremity  swelling or redness, warmth or streaking noted.    Neurological: She is alert and oriented to person, place, and time. Gait normal.   Skin: Skin is warm and dry.   Psychiatric: She has a normal mood and affect.       Assessment/Plan   Valery was seen today for hip pain.    Diagnoses and all orders for this visit:    Status post left hip replacement  -     Basic Metabolic Panel; Future  -     CBC No Differential; Future    Elevated CK  -     CK; Future    will recheck CK levels.

## 2019-08-01 DIAGNOSIS — R94.4 DECREASED GFR: Primary | ICD-10-CM

## 2019-08-01 LAB — CK SERPL-CCNC: 85 U/L (ref 20–180)

## 2019-08-15 ENCOUNTER — LAB (OUTPATIENT)
Dept: INTERNAL MEDICINE | Facility: CLINIC | Age: 69
End: 2019-08-15

## 2019-08-15 DIAGNOSIS — R94.4 DECREASED GFR: ICD-10-CM

## 2019-08-15 LAB
ANION GAP SERPL CALCULATED.3IONS-SCNC: 11.6 MMOL/L (ref 5–15)
BUN BLD-MCNC: 16 MG/DL (ref 8–23)
BUN/CREAT SERPL: 18 (ref 7–25)
CALCIUM SPEC-SCNC: 9.7 MG/DL (ref 8.6–10.5)
CHLORIDE SERPL-SCNC: 99 MMOL/L (ref 98–107)
CO2 SERPL-SCNC: 27.4 MMOL/L (ref 22–29)
CREAT BLD-MCNC: 0.89 MG/DL (ref 0.57–1)
GFR SERPL CREATININE-BSD FRML MDRD: 63 ML/MIN/1.73
GLUCOSE BLD-MCNC: 93 MG/DL (ref 65–99)
POTASSIUM BLD-SCNC: 4 MMOL/L (ref 3.5–5.2)
SODIUM BLD-SCNC: 138 MMOL/L (ref 136–145)

## 2019-08-15 PROCEDURE — 80048 BASIC METABOLIC PNL TOTAL CA: CPT | Performed by: NURSE PRACTITIONER

## 2019-08-15 PROCEDURE — 36415 COLL VENOUS BLD VENIPUNCTURE: CPT | Performed by: NURSE PRACTITIONER

## 2019-08-28 ENCOUNTER — OFFICE VISIT (OUTPATIENT)
Dept: INTERNAL MEDICINE | Facility: CLINIC | Age: 69
End: 2019-08-28

## 2019-08-28 VITALS
SYSTOLIC BLOOD PRESSURE: 120 MMHG | BODY MASS INDEX: 34.83 KG/M2 | OXYGEN SATURATION: 98 % | WEIGHT: 248 LBS | DIASTOLIC BLOOD PRESSURE: 74 MMHG | HEART RATE: 85 BPM

## 2019-08-28 DIAGNOSIS — I10 ESSENTIAL HYPERTENSION: ICD-10-CM

## 2019-08-28 DIAGNOSIS — R42 DIZZINESS: ICD-10-CM

## 2019-08-28 DIAGNOSIS — R42 VERTIGO: Primary | ICD-10-CM

## 2019-08-28 DIAGNOSIS — E78.49 OTHER HYPERLIPIDEMIA: ICD-10-CM

## 2019-08-28 DIAGNOSIS — Z12.31 ENCOUNTER FOR SCREENING MAMMOGRAM FOR BREAST CANCER: Primary | ICD-10-CM

## 2019-08-28 PROCEDURE — 99214 OFFICE O/P EST MOD 30 MIN: CPT | Performed by: INTERNAL MEDICINE

## 2019-08-28 NOTE — PROGRESS NOTES
Subjective   Valery Zabala is a 69 y.o. female here for   Chief Complaint   Patient presents with   • Dizziness   • Hypertension   • Hyperlipidemia   .    Vitals:    08/28/19 0819   BP: 120/74   BP Location: Left arm   Patient Position: Sitting   Cuff Size: Adult   Pulse: 85   SpO2: 98%   Weight: 112 kg (248 lb)       Body mass index is 34.83 kg/m².    Dizziness   This is a recurrent problem. The current episode started more than 1 year ago. The problem occurs intermittently. The problem has been waxing and waning. Pertinent negatives include no chest pain, chills, coughing, fatigue, fever or headaches.   Hyperlipidemia   This is a chronic problem. The current episode started more than 1 year ago. The problem is controlled. Recent lipid tests were reviewed and are normal. She has no history of diabetes. Pertinent negatives include no chest pain or shortness of breath.   Hypertension   This is a chronic problem. The current episode started more than 1 year ago. The problem is unchanged. The problem is controlled. Pertinent negatives include no chest pain, headaches, palpitations or shortness of breath.        The following portions of the patient's history were reviewed and updated as appropriate: allergies, current medications, past social history and problem list.    Review of Systems   Constitutional: Negative for chills, fatigue and fever.   Respiratory: Negative for cough, shortness of breath and wheezing.    Cardiovascular: Negative for chest pain, palpitations and leg swelling.   Neurological: Positive for light-headedness. Negative for dizziness and headaches.   Psychiatric/Behavioral: Negative for dysphoric mood and sleep disturbance. The patient is not nervous/anxious.      Worse for 7 wks (since left hip replaced).    Objective   Physical Exam   Constitutional: She appears well-developed and well-nourished. No distress.   Cardiovascular: Normal rate, regular rhythm and normal heart sounds.    Pulmonary/Chest: No respiratory distress. She has no wheezes. She has no rales. She exhibits no tenderness.   Musculoskeletal: She exhibits no edema.   Psychiatric: She has a normal mood and affect. Her behavior is normal.   Nursing note and vitals reviewed.    Gait normal.   +dizzy with head movement    Assessment/Plan   Diagnoses and all orders for this visit:    Vertigo  Comments:  appears to be BPV - refer to PT  Orders:  -     Ambulatory Referral to Physical Therapy Evaluate and treat, Vestibular    Essential hypertension  Comments:  controlled -call if bp over 140/90    Dizziness  Comments:  refer to PT - need to keep hydrated  Orders:  -     Ambulatory Referral to Physical Therapy Evaluate and treat, Vestibular    Other hyperlipidemia  Comments:  need diet/ex

## 2019-09-10 RX ORDER — ACYCLOVIR 200 MG/1
CAPSULE ORAL
Qty: 180 CAPSULE | Refills: 4 | Status: SHIPPED | OUTPATIENT
Start: 2019-09-10

## 2019-09-16 ENCOUNTER — HOSPITAL ENCOUNTER (OUTPATIENT)
Dept: PHYSICAL THERAPY | Facility: HOSPITAL | Age: 69
Setting detail: THERAPIES SERIES
Discharge: HOME | End: 2019-09-16

## 2019-09-16 DIAGNOSIS — R42 DIZZINESS: ICD-10-CM

## 2019-09-16 DIAGNOSIS — R42 VERTIGO: Primary | ICD-10-CM

## 2019-09-16 PROCEDURE — 97161 PT EVAL LOW COMPLEX 20 MIN: CPT | Performed by: PHYSICAL THERAPIST

## 2019-09-16 PROCEDURE — 97530 THERAPEUTIC ACTIVITIES: CPT | Performed by: PHYSICAL THERAPIST

## 2019-09-16 NOTE — THERAPY EVALUATION
Outpatient Physical Therapy Vestibular Initial Evaluation  [facility]     Patient Name: Valery Zabala  : 1950  MRN: [id number]  Today's Date: 2019      Visit Date: 2019    Patient Active Problem List   Diagnosis   • Hypertension   • Hyperlipidemia   • Hx of colonic polyps   • Vitamin D deficiency   • Palpitations   • GERD (gastroesophageal reflux disease)   • IBS (irritable bowel syndrome)   • Insomnia   • Sleep apnea   • Slow transit constipation   • Multiple joint pain   • Other seasonal allergic rhinitis   • Chronic fatigue   • Localized edema   • Bilateral carotid bruits   • Atherosclerosis of both carotid arteries   • Rheumatic fever   • Chronic pain of both knees   • Muscle spasm   • Chronic left hip pain   • Elevated glucose   • Neck muscle spasm   • Leg cramps   • Anxiety and depression   • Primary osteoarthritis of left hip   • Status post left hip replacement   • Dizziness        Past Medical History:   Diagnosis Date   • Atherosclerosis of both carotid arteries 2018    carotid duplex 2018: mild right proximal stenosis; left normal; plaque bilateral carotid bowel    • Essential hypertension    • Fatigue    • Frequent headaches    • GERD (gastroesophageal reflux disease)    • History of anemia    • Hx of colonic polyps    • Hyperlipidemia    • Hypertension    • Insomnia    • Irritable bowel syndrome with constipation    • Localized edema    • Multiple joint pain    • MARANDA (obstructive sleep apnea)     compliant with Bipap machine    • Palpitations    • Primary insomnia    • Rheumatic fever    • Sleep disturbance    • Vitamin D deficiency         Past Surgical History:   Procedure Laterality Date   • APPENDECTOMY     • ARTERIOVENOUS FISTULA REPAIR     • EYE SURGERY     • HYSTERECTOMY     • SHOULDER SURGERY     • TONSILLECTOMY           Visit Dx:     ICD-10-CM ICD-9-CM   1. Vertigo R42 780.4       Patient History     Row Name 19 0900             History     "Chief Complaint  Dizziness  -GR      Date Current Problem(s) Began  07/17/19  -GR      Brief Description of Current Complaint  Onset of vertigo since having her L hip replaced in July, and mild symptoms intermittently 2 years prior.  She was having some symptoms prior to her surgery especially with rolling to the R but since the surgery it got worse and she was only laying supine.  She states her symptoms are actually starting to improve now and episodes are short in duration.  She denies recent change to vision/hearing. She solis have occasional \"noise\" not necessarily tinnitus in the R ear.  She has never been treated for this and does not take medication. Had occular surgery at childhood and reports her R eye is her dominant eye.  -GR      Patient/Caregiver Goals  Know what to do to help the symptoms;Relief from dizziness  -GR      Occupation/sports/leisure activities  playing with grandchildren, walking with her   -GR      Are you or can you be pregnant  No  -GR         Pain     Pain Location  Knee  -GR      Pain at Present  4  -GR      Pain at Best  4  -GR      Pain at Worst  7  -GR      Is your sleep disturbed?  No  -GR         Fall Risk Assessment    Any falls in the past year:  Yes  -GR      Number of falls reported in the last 12 months  1  -GR      Factors that contributed to the fall:  Tripped  -GR         Services    Prior Rehab/Home Health Experiences  Yes  -GR      When was the prior experience with Rehab/Home Health  HH for hip replacement  -GR      Are you currently receiving Home Health services  No  -GR         Daily Activities    Primary Language  English  -GR      How does patient learn best?  Reading  -GR      Pt Participated in POC and Goals  Yes  -GR         Safety    Are you being hurt, hit, or frightened by anyone at home or in your life?  No  -GR      Are you being neglected by a caregiver  No  -GR        User Key  (r) = Recorded By, (t) = Taken By, (c) = Cosigned By    Initials Name " Provider Type    Liu Funes, PT Physical Therapist          Vestibular Eval     Row Name 09/16/19 0900             Occulomotor Exam Fixation Present    Occular ROM  Normal  -GR      Spontaneous Nystagmus  Absent  -GR      Gaze-induced Nystagmus  Absent  -GR      Smooth Pursuit  Symptom Provoking with fast pursuit  -GR      Saccades  Intact  -GR      Convergence  Abnormal h/ooccular surgery at childhood  -GR         Positional Testing    Positional Testing  Without infrared goggles  -GR      Vertebrobasilar Artery Screen - Right  Negative  -GR      Vertebrobasilar Artery Screen - Left  Negative  -GR      Jose-Hallpike Right  No nystagmus  -GR      [place]-Hallpike Left  No nystagmus  -GR      Horizontal Roll Test Right  No nystagmus  -GR      Horizontal Roll Test Left  No nystagmus  -GR        User Key  (r) = Recorded By, (t) = Taken By, (c) = Cosigned By    Initials Name Provider Type    Liu Funes, PT Physical Therapist                      Therapy Education  Education Details: Role of outpatient PT, vestibular anatomy, differential diagnosis, tri-part balance system, expectations, initial HEP, static fixation follow up plans.  Given: HEP  Program: New  How Provided: Verbal, Demonstration, Written  Provided to: Patient  Level of Understanding: Demonstrated, Teach back education performed, Verbalized      OP Exercises     Row Name 09/16/19 1000             Total Minutes    71738 - PT Therapeutic Activity Minutes  8 + education  -GR         Exercise 1    Exercise Name 1  VORx1 seated  -GR      Cueing 1  Demo  -GR      Sets 1  1  -GR      Reps 1  2  -GR      Time 1  30 seconds  -GR      Additional Comments  90 bpm metronome  -GR        User Key  (r) = Recorded By, (t) = Taken By, (c) = Cosigned By    Initials Name Provider Type    Liu Funes, PT Physical Therapist                      PT OP Goals     Row Name 09/16/19 1000          PT Short Term Goals    STG Date to Achieve  10/01/19  -GR      STG 1  Patient will be independent with static habituation PRN for in home safety.  -GR     STG 1 Progress  New  -GR     STG 2  Patient will report no worse with VOR.  -GR     STG 2 Progress  New  -GR        Long Term Goals    LTG Date to Achieve  10/31/19  -GR     LTG 1  Patient will remain negative for BPPV of all canals.  -GR     LTG 1 Progress  New  -GR     LTG 2  Patient will score </= 16/100 on the dizziness handicap inventory to indicate improved perceived positional tolerance.  -GR     LTG 2 Progress  New  -GR     LTG 3  Patient will be independent with dynamic adaptation techniques for community reintegration.  -GR     LTG 3 Progress  New  -GR        Time Calculation    PT Goal Re-Cert Due Date  12/15/19  -GR       User Key  (r) = Recorded By, (t) = Taken By, (c) = Cosigned By    Initials Name Provider Type    Liu Funes, PT Physical Therapist          PT Assessment/Plan     Row Name 09/16/19 1046          PT Assessment    Functional Limitations  Performance in leisure activities;Limitations in community activities  -GR     Impairments  Balance  -GR     Assessment Comments  69 y.o. female referred to outpatient physical therapy for vestibular evaluation.  Signs and symptoms are consistent with resolving vestibular neuritis.  Oculomotor exam reveals symptomatic smooth pursuit; BPPV screen is negative. Pertinent comorbidities and personal factors that may affect progress include, but are not limited to, recent orthopedic surgery (NAVDEEP L), previous vertigo, HTN.  This condition is stable. Recommend skilled PT to address functional deficit on a PRN basis pending her symptom progression and response to HEP. Thank you for this referral.  -GR     Please refer to paper survey for additional self-reported information  Yes  -GR     Rehab Potential  Good  -GR     Patient/caregiver participated in establishment of treatment plan and goals  Yes  -GR     Patient would benefit from skilled therapy intervention   Yes  -GR        PT Plan    PT Frequency  1x/week  -GR     Predicted Duration of Therapy Intervention (Therapy Eval)  2-4 visits  -GR     Planned CPT's?  PT EVAL LOW COMPLEXITY: 57004;PT RE-EVAL: 52824;PT THER PROC EA 15 MIN: 59263;PT THER ACT EA 15 MIN: 65992;PT NEUROMUSC RE-EDUCATION EA 15 MIN: 83401;PT CANALITH REPOSITIONIN  -GR     Physical Therapy Interventions (Optional Details)  balance training;home exercise program;neuromuscular re-education;patient/family education;vestibular training  -GR     PT Plan Comments  Return PRN to recheck VOR, issue habituation if appropriate.  -GR       User Key  (r) = Recorded By, (t) = Taken By, (c) = Cosigned By    Initials Name Provider Type    GR Liu Ahn, PT Physical Therapist           Outcome Measure Options: Dizziness Handicap Inventory(36/100)         Time Calculation:   Start Time: 0930  Stop Time: 1010  Time Calculation (min): 40 min  Total Timed Code Minutes- PT: 8 minute(s)   Therapy Charges for Today     Code Description Service Date Service Provider Modifiers Qty    82669515257  PT THERAPEUTIC ACT EA 15 MIN 2019 Liu Ahn, PT GP 1    [id number]  PT EVAL LOW COMPLEXITY 2 2019 Liu Ahn, PT GP 1          PT G-Codes  Outcome Measure Options: Dizziness Handicap Inventory(36/100)         Liu Ahn PT  2019

## 2019-09-18 ENCOUNTER — OFFICE VISIT (OUTPATIENT)
Dept: SLEEP MEDICINE | Facility: HOSPITAL | Age: 69
End: 2019-09-18

## 2019-09-18 VITALS — WEIGHT: 252 LBS | HEIGHT: 71 IN | OXYGEN SATURATION: 98 % | HEART RATE: 82 BPM | BODY MASS INDEX: 35.28 KG/M2

## 2019-09-18 DIAGNOSIS — G47.33 OSA (OBSTRUCTIVE SLEEP APNEA): Primary | ICD-10-CM

## 2019-09-18 DIAGNOSIS — E66.01 CLASS 2 SEVERE OBESITY DUE TO EXCESS CALORIES WITH SERIOUS COMORBIDITY AND BODY MASS INDEX (BMI) OF 35.0 TO 35.9 IN ADULT (HCC): ICD-10-CM

## 2019-09-18 PROCEDURE — 99203 OFFICE O/P NEW LOW 30 MIN: CPT | Performed by: INTERNAL MEDICINE

## 2019-09-18 PROCEDURE — G0463 HOSPITAL OUTPT CLINIC VISIT: HCPCS

## 2019-09-18 NOTE — PROGRESS NOTES
[facility] New Patient[facility]Consultation       Reason for Consultation: MARANDA    Patient Care Team:  Julianna Howell MD as PCP - General  Joe Muse DPM as PCP - Claims Attributed  Alix Alexander MD as Consulting Physician (Orthopedic Surgery)  Jorge Chairez MD as Consulting Physician (Otolaryngology)  Roman Hazel MD as Consulting Physician (Urology)  Patricio Flores MD as Consulting Physician (Allergy)  Sai Granda MD as Consulting Physician (Gastroenterology)  Magdalena Christensen MD as Consulting Physician (Dermatology)  Luanne Levin MD as Consulting Physician (Cardiology)  Claudette Robledo MD as Consulting Physician (Infectious Diseases)  Jorge Marrero MD as Consulting Physician (Sleep Medicine)    Chief complaint: MARANDA    History of present illness:    Thank you for asking me to see your patient.  The patient is a 69 y.o. female who has a known history of obstructive sleep apnea treated with auto titrating BiPAP.  The patient is not in need of a face-to-face appointment and new orders for supplies.  The patient goes to bed at 10:30 PM and awakens at 7 AM and she is appropriate refreshed upon awakening.  She has no complaints of hypersomnolence and her [place] Sleepiness Scale is normal at 2.  She has no other symptoms related to MARANDA as long as she uses her auto BiPAP.  She will use the restroom once or twice during the nighttime.    Review of Systems:    A complete review of systems was done and all were negative with the exception of nasal congestion, painful joints, swollen ankles toward the end of the day, and KATHY.    History:  Past Medical History:   Diagnosis Date   • Atherosclerosis of both carotid arteries 05/21/2018    carotid duplex 6/2018: mild right proximal stenosis; left normal; plaque bilateral carotid bowel    • Essential hypertension    • Fatigue    • Frequent headaches    • GERD (gastroesophageal reflux disease)    • History of anemia   "  • Hx of colonic polyps    • Hyperlipidemia    • Hypertension    • Insomnia    • Irritable bowel syndrome with constipation    • Localized edema    • Multiple joint pain    • MARANDA (obstructive sleep apnea) 06/15/2010    Overnight polysomnogram with weight 238 pounds.  AHI mildly abnormal 7 events per hour.  No REM sleep noted.  No sleep-related hypoxia noted.  The patient is adequately treated with auto BiPAP.   • Palpitations    • Primary insomnia    • Rheumatic fever    • Sleep disturbance    • Vitamin D deficiency    ,   Past Surgical History:   Procedure Laterality Date   • APPENDECTOMY     • ARTERIOVENOUS FISTULA REPAIR  1986   • EYE SURGERY     • HYSTERECTOMY  1994   • SHOULDER SURGERY  1997   • TONSILLECTOMY     ,   Family History   Problem Relation Age of Onset   • Thyroid disease Mother    • Heart disease Mother    • Stroke Mother    • Hypertension Mother    • Heart disease Father    • Hypertension Sister    • Stroke Maternal Grandmother    • Diabetes Neg Hx     and   Social History     Tobacco Use   • Smoking status: Former Smoker     Years: 1.00   • Smokeless tobacco: Never Used   • Tobacco comment: Daily caffeine use   Substance Use Topics   • Alcohol use: Yes     Comment: 1 or 2/week   • Drug use: No       Social History: The patient is retired.  One cup of coffee a day.    Allergies:  Gentamicin; Vancomycin; Penicillins; Ceclor [cefaclor]; Effexor [venlafaxine]; Fentanyl; Keflex [cephalexin]; Maxzide [hydrochlorothiazide w-triamterene]; Neurontin [gabapentin]; Sulfa antibiotics; Tetracyclines & related; and Zoloft [sertraline hcl]     Medication Review: Reviewed.    Vital Signs:    Vitals:    09/18/19 0900   Pulse: 82   SpO2: 98%   Weight: 114 kg (252 lb)   Height: 180.3 cm (71\")      Body mass index is 35.15 kg/m².  Neck Circumference: 15 inches      Physical Exam:    Constitutional:  Well developed 69 y.o. female that appears in no apparent distress.  Awake & oriented times 3.  Normal mood with " normal recent and remote memory and normal judgement.  Eyes:  Conjunctivae normal.  Oropharynx: Moist mucous membranes without exudate and a borderline enlarged tongue and normal uvula and patent posterior pharyngeal opening and class II MP airway  Neck: Trachea midline  Respiratory: Effort is not labored  Cardiovascular: Radial pulse regular  Musculoskeletal: Gait appears normal, no digital clubbing evident, no pre-tibial edema    Results Review: I reviewed her overnight polysomnogram from 2010.    Downloads between 6/20 and 9/17/2019 revealed compliance to be 99% and average usage 7 hours and 16 minutes and average AHI of normal without leak and average auto BiPAP pressures with IPAP of 10 and EPAP of 6.4 and her auto BiPAP settings: Max IPAP 25 minimum EPAP 4 and maximum pressure support of 8 and minimum pressure support of 2       Impression:   Obstructive sleep apnea adequately treated with auto titrating BiPAP with good compliance and usage and the patient is benefiting from therapy being provided.    Plan:  Good sleep hygiene measures should be maintained.  Weight loss would be beneficial in this patient who is obese.    Pathophysiology of MARANDA briefly described to the patient.      After reviewing all with the patient, she will continue auto BiPAP as she is doing.  A new prescription will be sent to her DME.  I will see the patient back in 1 year.    Thank you for requesting me to assist in this patient's care.    Jorge Marrero MD  Sleep Medicine  09/18/19  9:31 AM

## 2019-09-20 ENCOUNTER — TELEPHONE (OUTPATIENT)
Dept: INTERNAL MEDICINE | Facility: CLINIC | Age: 69
End: 2019-09-20

## 2019-09-20 DIAGNOSIS — H81.90 DISORDER OF VESTIBULAR FUNCTION, UNSPECIFIED LATERALITY: Primary | ICD-10-CM

## 2019-09-20 NOTE — TELEPHONE ENCOUNTER
----- Message from Yessy Gallardo sent at 9/20/2019  3:06 PM EDT -----  Contact: pt  Pt has been in Physical Therapy to Evaluate and treat, Vestibular. However they think ENT is the best option, could she please get a referral for Dr Adriano Carreno.    Pt# [phone]

## 2019-09-29 PROBLEM — E66.01 CLASS 2 SEVERE OBESITY DUE TO EXCESS CALORIES WITH SERIOUS COMORBIDITY AND BODY MASS INDEX (BMI) OF 35.0 TO 35.9 IN ADULT (HCC): Status: ACTIVE | Noted: 2019-09-29

## 2019-11-19 ENCOUNTER — OFFICE VISIT (OUTPATIENT)
Dept: INTERNAL MEDICINE | Facility: CLINIC | Age: 69
End: 2019-11-19

## 2019-11-19 VITALS
SYSTOLIC BLOOD PRESSURE: 128 MMHG | OXYGEN SATURATION: 98 % | BODY MASS INDEX: 34.3 KG/M2 | HEART RATE: 74 BPM | WEIGHT: 245 LBS | DIASTOLIC BLOOD PRESSURE: 82 MMHG | HEIGHT: 71 IN

## 2019-11-19 DIAGNOSIS — M25.562 CHRONIC PAIN OF BOTH KNEES: ICD-10-CM

## 2019-11-19 DIAGNOSIS — R42 VERTIGO: ICD-10-CM

## 2019-11-19 DIAGNOSIS — E55.9 VITAMIN D DEFICIENCY: ICD-10-CM

## 2019-11-19 DIAGNOSIS — R73.09 ELEVATED GLUCOSE: ICD-10-CM

## 2019-11-19 DIAGNOSIS — G89.29 CHRONIC PAIN OF BOTH KNEES: ICD-10-CM

## 2019-11-19 DIAGNOSIS — M25.561 CHRONIC PAIN OF BOTH KNEES: ICD-10-CM

## 2019-11-19 DIAGNOSIS — R53.82 CHRONIC FATIGUE: ICD-10-CM

## 2019-11-19 DIAGNOSIS — I10 ESSENTIAL HYPERTENSION: Primary | ICD-10-CM

## 2019-11-19 DIAGNOSIS — I65.23 ATHEROSCLEROSIS OF BOTH CAROTID ARTERIES: ICD-10-CM

## 2019-11-19 DIAGNOSIS — K21.9 GASTROESOPHAGEAL REFLUX DISEASE, ESOPHAGITIS PRESENCE NOT SPECIFIED: ICD-10-CM

## 2019-11-19 DIAGNOSIS — Z00.00 MEDICARE ANNUAL WELLNESS VISIT, SUBSEQUENT: ICD-10-CM

## 2019-11-19 DIAGNOSIS — Z86.010 HX OF COLONIC POLYPS: ICD-10-CM

## 2019-11-19 DIAGNOSIS — K58.1 IRRITABLE BOWEL SYNDROME WITH CONSTIPATION: ICD-10-CM

## 2019-11-19 DIAGNOSIS — E78.49 OTHER HYPERLIPIDEMIA: ICD-10-CM

## 2019-11-19 PROCEDURE — G0439 PPPS, SUBSEQ VISIT: HCPCS | Performed by: INTERNAL MEDICINE

## 2019-11-19 PROCEDURE — 99214 OFFICE O/P EST MOD 30 MIN: CPT | Performed by: INTERNAL MEDICINE

## 2019-11-19 NOTE — PROGRESS NOTES
"Subjective   Valery Zabala is a 69 y.o. female here for   Chief Complaint   Patient presents with   • Hyperlipidemia   • Hypertension   • Heartburn   • Irritable Bowel Syndrome   • Medicare Wellness-subsequent   .    Vitals:    11/19/19 0807   BP: 128/82   BP Location: Left arm   Patient Position: Sitting   Cuff Size: Adult   Pulse: 74   SpO2: 98%   Weight: 111 kg (245 lb)   Height: 180.3 cm (71\")       Body mass index is 34.17 kg/m².    Hyperlipidemia   This is a chronic problem. The current episode started more than 1 year ago. The problem is controlled. Recent lipid tests were reviewed and are normal. She has no history of diabetes. Pertinent negatives include no chest pain or shortness of breath.   Hypertension   This is a chronic problem. The current episode started more than 1 year ago. The problem is unchanged. The problem is controlled. Pertinent negatives include no chest pain, palpitations or shortness of breath.   Heartburn   She complains of heartburn. She reports no chest pain, no coughing or no wheezing. This is a recurrent problem. The current episode started more than 1 year ago. The problem occurs occasionally. The problem has been waxing and waning. Pertinent negatives include no fatigue.   Irritable Bowel Syndrome   This is a chronic problem. The current episode started more than 1 year ago. The problem occurs constantly. The problem has been unchanged. Pertinent negatives include no chest pain, chills, coughing, fatigue or fever.        The following portions of the patient's history were reviewed and updated as appropriate: allergies, current medications, past social history and problem list.    Review of Systems   Constitutional: Negative for chills, fatigue and fever.   Respiratory: Negative for cough, shortness of breath and wheezing.    Cardiovascular: Negative for chest pain, palpitations and leg swelling.   Gastrointestinal: Positive for heartburn.   Psychiatric/Behavioral: Negative for " dysphoric mood and sleep disturbance. The patient is not nervous/anxious.      Hip surgery 7/17/2019 - to have knee surgery soon.    Objective   Physical Exam   Constitutional: She appears well-developed and well-nourished. No distress.   Cardiovascular: Normal rate, regular rhythm and normal heart sounds.   Pulmonary/Chest: No respiratory distress. She has no wheezes. She has no rales. She exhibits no tenderness.   Musculoskeletal: She exhibits no edema.   Psychiatric: She has a normal mood and affect. Her behavior is normal.   Nursing note and vitals reviewed.      Assessment/Plan   Diagnoses and all orders for this visit:    Essential hypertension  Comments:  controlled - call if bp over 140/90    Other hyperlipidemia  Comments:  need diet/ex    Gastroesophageal reflux disease, esophagitis presence not specified  Comments:  controlled - call if worse    Irritable bowel syndrome with constipation  Comments:  controlled -call if worse    Vitamin D deficiency  Comments:  continue daily vit d    Hx of colonic polyps  Comments:  need routine c-scope    Chronic pain of both knees  Comments:  f/u with ortho    Chronic fatigue  Comments:  mild - call if worse    Elevated glucose  Comments:  need low sugar diet    Medicare annual wellness visit, subsequent    Atherosclerosis of both carotid arteries  Comments:  need rechk  Orders:  -     Duplex Carotid Ultrasound CAR; Future    Vertigo  Comments:  ok with epley manuver 2-3x daily (seen by ENT)     Wellness today.   Need daily strengthening & balance exercises (shown today).   Need repeat doppler b/c carotid disease.  Information given today.

## 2019-11-19 NOTE — PROGRESS NOTES
The ABCs of the Annual Wellness Visit  Subsequent Medicare Wellness Visit    Chief Complaint   Patient presents with   • Hyperlipidemia   • Hypertension   • Heartburn   • Irritable Bowel Syndrome       Subjective   History of Present Illness:  Valery Zabala is a 69 y.o. female who presents for a Subsequent Medicare Wellness Visit.    HEALTH RISK ASSESSMENT    Recent Hospitalizations:  Recently treated at the following:  Other: [facility]    Current Medical Providers:  Patient Care Team:  Julianna Howell MD as PCP - General  UnJoe saldivar DPM as PCP - Claims Attributed  Alix Alexander MD as Consulting Physician (Orthopedic Surgery)  Jorge Chairez MD as Consulting Physician (Otolaryngology)  Roman Hazel MD as Consulting Physician (Urology)  Patricio Flores MD as Consulting Physician (Allergy)  Sai Granda MD as Consulting Physician (Gastroenterology)  Magdalena Christensen MD as Consulting Physician (Dermatology)  Luanne Levin MD as Consulting Physician (Cardiology)  Claudette Robledo MD as Consulting Physician (Infectious Diseases)  Adriano Carreno MD as Consulting Physician (Otolaryngology)  Jose Powers MD as Surgeon (Orthopedic Surgery)  Pietro Toribio MD as Surgeon (Orthopedic Surgery)    Smoking Status:  Social History     Tobacco Use   Smoking Status Former Smoker   • Years: 1.00   Smokeless Tobacco Never Used   Tobacco Comment    Daily caffeine use       Alcohol Consumption:  Social History     Substance and Sexual Activity   Alcohol Use Yes    Comment: 1 or 2/week       Depression Screen:   PHQ-2/PHQ-9 Depression Screening 11/19/2019   Little interest or pleasure in doing things 0   Feeling down, depressed, or hopeless 0   Trouble falling or staying asleep, or sleeping too much 0   Feeling tired or having little energy 0   Poor appetite or overeating 0   Feeling bad about yourself - or that you are a failure or have let yourself or your  family down 0   Trouble concentrating on things, such as reading the newspaper or watching television 0   Moving or speaking so slowly that other people could have noticed. Or the opposite - being so fidgety or restless that you have been moving around a lot more than usual 0   Thoughts that you would be better off dead, or of hurting yourself in some way 0   Total Score 0       Fall Risk Screen:  SETH Fall Risk Assessment was completed, and patient is at LOW risk for falls.Assessment completed on:11/19/2019    Health Habits and Functional and Cognitive Screening:  Functional & Cognitive Status 11/19/2019   Do you have difficulty preparing food and eating? No   Do you have difficulty bathing yourself, getting dressed or grooming yourself? No   Do you have difficulty using the toilet? No   Do you have difficulty moving around from place to place? No   Do you have trouble with steps or getting out of a bed or a chair? No   Current Diet Well Balanced Diet   Dental Exam Up to date   Eye Exam Up to date   Exercise (times per week) 2 times per week   Current Exercise Activities Include Walking   Do you need help using the phone?  No   Are you deaf or do you have serious difficulty hearing?  No   Do you need help with transportation? No   Do you need help shopping? No   Do you need help preparing meals?  No   Do you need help with housework?  No   Do you need help with laundry? No   Do you need help taking your medications? No   Do you need help managing money? No   Do you ever drive or ride in a car without wearing a seat belt? No   Have you felt unusual stress, anger or loneliness in the last month? No   Who do you live with? Spouse   If you need help, do you have trouble finding someone available to you? No   Have you been bothered in the last four weeks by sexual problems? No   Do you have difficulty concentrating, remembering or making decisions? No         Does the patient have evidence of cognitive impairment?  No    Asprin use counseling:Does not need ASA (and currently is not on it)    Age-appropriate Screening Schedule:  Refer to the list below for future screening recommendations based on patient's age, sex and/or medical conditions. Orders for these recommended tests are listed in the plan section. The patient has been provided with a written plan.    Health Maintenance   Topic Date Due   • ZOSTER VACCINE (2 of 2) 05/22/2020 (Originally 11/29/2012)   • DXA SCAN  04/24/2020   • LIPID PANEL  05/14/2020   • MAMMOGRAM  08/29/2020   • TDAP/TD VACCINES (2 - Td) 05/19/2025   • COLONOSCOPY  05/16/2028   • INFLUENZA VACCINE  Completed   • PNEUMOCOCCAL VACCINES (65+ LOW/MEDIUM RISK)  Completed          The following portions of the patient's history were reviewed and updated as appropriate: allergies, current medications, past family history, past medical history, past social history, past surgical history and problem list.    Outpatient Medications Prior to Visit   Medication Sig Dispense Refill   • acyclovir (ZOVIRAX) 200 MG capsule TAKE 2 CAPSULES DAILY 180 capsule 4   • ALTACE 10 MG capsule TAKE 1 CAPSULE DAILY 90 capsule 3   • baclofen (LIORESAL) 10 MG tablet Take 1 tablet by mouth 3 (Three) Times a Day. For muscle spasm 30 tablet 5   • BYSTOLIC 5 MG tablet TAKE ONE-HALF (1/2) TABLET DAILY 45 tablet 2   • cholecalciferol (VITAMIN D3) 1000 UNITS tablet Take 2,000 Units by mouth daily.     • cycloSPORINE (RESTASIS) 0.05 % ophthalmic emulsion 1 drop 2 (two) times a day.     • docusate sodium (COLACE) 250 MG capsule Take 250 mg by mouth daily.     • doxepin (SINEquan) 25 MG capsule      • fluticasone (FLONASE) 50 MCG/ACT nasal spray USE 2 SPRAYS IN EACH NOSTRIL ( AS EACH DOSE ) DAILY 48 g 2   • Linaclotide (LINZESS) 145 MCG capsule Take 1 capsule by mouth Daily.     • Loratadine (CLARITIN) 10 MG capsule Take 1 capsule by mouth Daily.     • naproxen sodium (ALEVE) 220 MG tablet Take 220 mg by mouth Daily.     • omeprazole  "(priLOSEC) 20 MG capsule Take 20 mg by mouth Daily.       No facility-administered medications prior to visit.        Patient Active Problem List   Diagnosis   • Hypertension   • Hyperlipidemia   • Hx of colonic polyps   • Vitamin D deficiency   • Palpitations   • GERD (gastroesophageal reflux disease)   • IBS (irritable bowel syndrome)   • Slow transit constipation   • Multiple joint pain   • Other seasonal allergic rhinitis   • Chronic fatigue   • Localized edema   • Bilateral carotid bruits   • Atherosclerosis of both carotid arteries   • Rheumatic fever   • Chronic pain of both knees   • Muscle spasm   • Chronic left hip pain   • Elevated glucose   • Neck muscle spasm   • Leg cramps   • Anxiety and depression   • Primary osteoarthritis of left hip   • Status post left hip replacement   • Dizziness   • MARANDA (obstructive sleep apnea)   • Class 2 severe obesity due to excess calories with serious comorbidity and body mass index (BMI) of 35.0 to 35.9 in adult (CMS/[facility])       Advanced Care Planning:  Patient has an advance directive - a copy has not been provided. Have asked the patient to send this to us to add to record    Review of Systems    Compared to one year ago, the patient feels her physical health is the same.  Compared to one year ago, the patient feels her mental health is the same.    Reviewed chart for potential of high risk medication in the elderly: not applicable  Reviewed chart for potential of harmful drug interactions in the elderly:not applicable    Objective         Vitals:    11/19/19 0807   BP: 128/82   BP Location: Left arm   Patient Position: Sitting   Cuff Size: Adult   Pulse: 74   SpO2: 98%   Weight: 111 kg (245 lb)   Height: 180.3 cm (71\")       Body mass index is 34.17 kg/m².  Discussed the patient's BMI with her. The BMI is above average; BMI management plan is completed.    Physical Exam          Assessment/Plan   Medicare Risks and Personalized Health Plan  CMS Preventative Services " Quick Reference  Advance Directive Discussion  Fall Risk  Obesity/Overweight     The above risks/problems have been discussed with the patient.  Pertinent information has been shared with the patient in the After Visit Summary.  Follow up plans and orders are seen below in the Assessment/Plan Section.    Diagnoses and all orders for this visit:    1. Essential hypertension (Primary)    2. Other hyperlipidemia    3. Gastroesophageal reflux disease, esophagitis presence not specified    4. Irritable bowel syndrome with constipation    5. Vitamin D deficiency    6. Hx of colonic polyps    7. Chronic pain of both knees    8. Chronic fatigue    9. Elevated glucose    10. Medicare annual wellness visit, subsequent    11. Atherosclerosis of both carotid arteries  -     Duplex Carotid Ultrasound CAR; Future      Follow Up:  Return in about 6 months (around 5/19/2020) for Recheck.     An After Visit Summary and PPPS were given to the patient.

## 2019-11-19 NOTE — PATIENT INSTRUCTIONS
Medicare Wellness  Personal Prevention Plan of Service     Date of Office Visit:  2019  Encounter Provider:  Julianna Howell MD  Place of Service:  [facility] INTERNAL MEDICINE  Patient Name: Valery Zabala  :  1950    As part of the Medicare Wellness portion of your visit today, we are providing you with this personalized preventive plan of services (PPPS). This plan is based upon recommendations of the [place] Preventive Services Task Force (USPSTF) and the Advisory Committee on Immunization Practices (ACIP).    This lists the preventive care services that should be considered, and provides dates of when you are due. Items listed as completed are up-to-date and do not require any further intervention.    Health Maintenance   Topic Date Due   • MEDICARE ANNUAL WELLNESS  2019   • ZOSTER VACCINE (2 of 2) 2020 (Originally 2012)   • DXA SCAN  2020   • LIPID PANEL  2020   • MAMMOGRAM  2020   • TDAP/TD VACCINES (2 - Td) 2025   • COLONOSCOPY  2028   • HEPATITIS C SCREENING  Completed   • INFLUENZA VACCINE  Completed   • PNEUMOCOCCAL VACCINES (65+ LOW/MEDIUM RISK)  Completed       No orders of the defined types were placed in this encounter.      No Follow-up on file.

## 2019-12-05 ENCOUNTER — TELEPHONE (OUTPATIENT)
Dept: INTERNAL MEDICINE | Facility: CLINIC | Age: 69
End: 2019-12-05

## 2019-12-05 NOTE — TELEPHONE ENCOUNTER
Pt called and said that during her appt with Dr. Howell they discussed her getting a Carotid Screening done but she said she just spoke with her Cardiologist and they said she did have a Carotid screening done 18 months ago so she shouldn't need another one done.  She will not need that order anymore and just wanted to update Dr. Howell.

## 2019-12-16 DIAGNOSIS — R53.82 CHRONIC FATIGUE: ICD-10-CM

## 2019-12-16 DIAGNOSIS — I10 ESSENTIAL HYPERTENSION: Primary | ICD-10-CM

## 2019-12-16 DIAGNOSIS — E78.49 OTHER HYPERLIPIDEMIA: ICD-10-CM

## 2019-12-17 ENCOUNTER — LAB (OUTPATIENT)
Dept: INTERNAL MEDICINE | Facility: CLINIC | Age: 69
End: 2019-12-17

## 2019-12-17 DIAGNOSIS — I10 ESSENTIAL HYPERTENSION: ICD-10-CM

## 2019-12-17 DIAGNOSIS — R53.82 CHRONIC FATIGUE: ICD-10-CM

## 2019-12-17 DIAGNOSIS — E78.49 OTHER HYPERLIPIDEMIA: ICD-10-CM

## 2019-12-17 LAB
BASOPHILS # BLD AUTO: 0.02 10*3/MM3 (ref 0–0.2)
BASOPHILS NFR BLD AUTO: 0.3 % (ref 0–1.5)
EOSINOPHIL # BLD AUTO: 0.03 10*3/MM3 (ref 0–0.4)
EOSINOPHIL # BLD AUTO: 0.4 % (ref 0.3–6.2)
ERYTHROCYTE [DISTWIDTH] IN BLOOD BY AUTOMATED COUNT: 15.3 % (ref 12.3–15.4)
HCT VFR BLD AUTO: 34.8 % (ref 34–46.6)
HGB BLD-MCNC: 11.5 G/DL (ref 12–15.9)
IMM GRANULOCYTES # BLD: 0.09 10*3/MM3 (ref 0–0.05)
IMM GRANULOCYTES NFR BLD: 1.3 % (ref 0–0.5)
LYMPHOCYTES # BLD AUTO: 2.37 10*3/MM3 (ref 0.7–3.1)
LYMPHOCYTES NFR BLD AUTO: 34.5 % (ref 19.6–45.3)
MCH RBC QN AUTO: 28.8 PG (ref 26.6–33)
MCHC RBC AUTO-ENTMCNC: 33 G/DL (ref 31.5–35.7)
MCV RBC AUTO: 87.2 FL (ref 79–97)
MONOCYTES # BLD AUTO: 0.37 10*3/MM3 (ref 0.1–0.9)
MONOCYTES NFR BLD AUTO: 5.4 % (ref 5–12)
NEUTROPHILS # BLD AUTO: 3.98 10*3/MM3 (ref 1.7–7)
NEUTROPHILS NFR BLD AUTO: 58.1 % (ref 42.7–76)
NRBC BLD AUTO-RTO: 0 /100 WBC (ref 0–0.2)
PLATELET # BLD AUTO: 275 10*3/MM3 (ref 140–450)
RBC # BLD AUTO: 3.99 10*6/MM3 (ref 3.77–5.28)
WBC # BLD AUTO: 6.86 10*3/MM3 (ref 3.4–10.8)

## 2019-12-18 LAB
ALBUMIN SERPL-MCNC: 3.9 G/DL (ref 3.5–5.2)
ALBUMIN/GLOB SERPL: 1.6 G/DL
ALP SERPL-CCNC: 77 U/L (ref 39–117)
ALT SERPL-CCNC: 14 U/L (ref 1–33)
AST SERPL-CCNC: 17 U/L (ref 1–32)
BILIRUB SERPL-MCNC: 0.3 MG/DL (ref 0.2–1.2)
BUN SERPL-MCNC: 15 MG/DL (ref 8–23)
BUN/CREAT SERPL: 15.3 (ref 7–25)
CALCIUM SERPL-MCNC: 8.9 MG/DL (ref 8.6–10.5)
CHLORIDE SERPL-SCNC: 99 MMOL/L (ref 98–107)
CHOLEST SERPL-MCNC: 175 MG/DL (ref 0–200)
CO2 SERPL-SCNC: 28.7 MMOL/L (ref 22–29)
CREAT SERPL-MCNC: 0.98 MG/DL (ref 0.57–1)
GLOBULIN SER CALC-MCNC: 2.5 GM/DL
GLUCOSE SERPL-MCNC: 91 MG/DL (ref 65–99)
HDLC SERPL-MCNC: 45 MG/DL (ref 40–60)
LDLC SERPL CALC-MCNC: 116 MG/DL (ref 0–100)
POTASSIUM SERPL-SCNC: 4.3 MMOL/L (ref 3.5–5.2)
PROT SERPL-MCNC: 6.4 G/DL (ref 6–8.5)
SODIUM SERPL-SCNC: 138 MMOL/L (ref 136–145)
TRIGL SERPL-MCNC: 71 MG/DL (ref 0–150)
TSH SERPL-ACNC: 1.96 UIU/ML (ref 0.27–4.2)
VLDLC SERPL-MCNC: 14.2 MG/DL

## 2019-12-27 DIAGNOSIS — I10 ESSENTIAL HYPERTENSION: ICD-10-CM

## 2019-12-27 RX ORDER — DOXEPIN HYDROCHLORIDE 25 MG/1
CAPSULE ORAL
Qty: 90 CAPSULE | Refills: 4 | Status: SHIPPED | OUTPATIENT
Start: 2019-12-27

## 2019-12-27 RX ORDER — RAMIPRIL 10 MG
CAPSULE ORAL
Qty: 90 CAPSULE | Refills: 4 | Status: SHIPPED | OUTPATIENT
Start: 2019-12-27 | End: 2021-11-18 | Stop reason: SDUPTHER

## 2020-01-09 ENCOUNTER — OFFICE (AMBULATORY)
Dept: URBAN - METROPOLITAN AREA CLINIC 75 | Facility: CLINIC | Age: 70
End: 2020-01-09

## 2020-01-09 VITALS
OXYGEN SATURATION: 98 % | SYSTOLIC BLOOD PRESSURE: 130 MMHG | HEIGHT: 72 IN | DIASTOLIC BLOOD PRESSURE: 80 MMHG | WEIGHT: 247 LBS | HEART RATE: 84 BPM

## 2020-01-09 DIAGNOSIS — K21.9 GASTRO-ESOPHAGEAL REFLUX DISEASE WITHOUT ESOPHAGITIS: ICD-10-CM

## 2020-01-09 DIAGNOSIS — K29.70 GASTRITIS, UNSPECIFIED, WITHOUT BLEEDING: ICD-10-CM

## 2020-01-09 DIAGNOSIS — Z86.010 PERSONAL HISTORY OF COLONIC POLYPS: ICD-10-CM

## 2020-01-09 DIAGNOSIS — K57.30 DIVERTICULOSIS OF LARGE INTESTINE WITHOUT PERFORATION OR ABS: ICD-10-CM

## 2020-01-09 PROCEDURE — 99213 OFFICE O/P EST LOW 20 MIN: CPT | Performed by: INTERNAL MEDICINE

## 2020-01-09 RX ORDER — LINACLOTIDE 145 UG/1
CAPSULE, GELATIN COATED ORAL
Qty: 90 | Refills: 3 | Status: COMPLETED
End: 2020-07-07

## 2020-03-11 RX ORDER — NEBIVOLOL HYDROCHLORIDE 5 MG/1
TABLET ORAL
Qty: 45 TABLET | Refills: 0 | Status: SHIPPED | OUTPATIENT
Start: 2020-03-11 | End: 2020-08-17

## 2020-03-17 ENCOUNTER — TELEPHONE (OUTPATIENT)
Dept: CARDIOLOGY | Facility: CLINIC | Age: 70
End: 2020-03-17

## 2020-03-17 NOTE — TELEPHONE ENCOUNTER
Ms. Zabala returned your call regarding her appointment next week.  Thanks/[facility]    # [phone]

## 2020-03-17 NOTE — TELEPHONE ENCOUNTER
I spoke with patient via telephone about her upcoming appointment and avoiding any unnecessary risk of exposure.  She said she does want to canceled the appointment because her  has pulmonary fibrosis and she is getting over knee surgery from 2/24.     Jaycob -please cancel appointment with me on 3/24/2020.  She would like to reschedule for 6/24 or 6/26 with me.  Thank you

## 2020-04-01 ENCOUNTER — TELEPHONE (OUTPATIENT)
Dept: CARDIOLOGY | Facility: CLINIC | Age: 70
End: 2020-04-01

## 2020-04-02 NOTE — TELEPHONE ENCOUNTER
Please call patient.  She was scheduled to see me in the office on 3/24/2020.  Due to the COVID-19 the appointment was canceled.      Please let her know that we now have the capability of conducting a video or telephone visit.  Please schedule if she would like to proceed.  Thank you

## 2020-06-24 ENCOUNTER — OFFICE VISIT (OUTPATIENT)
Dept: CARDIOLOGY | Facility: CLINIC | Age: 70
End: 2020-06-24

## 2020-06-24 VITALS
HEIGHT: 71 IN | WEIGHT: 247 LBS | HEART RATE: 74 BPM | SYSTOLIC BLOOD PRESSURE: 99 MMHG | BODY MASS INDEX: 34.58 KG/M2 | DIASTOLIC BLOOD PRESSURE: 65 MMHG

## 2020-06-24 DIAGNOSIS — E78.49 OTHER HYPERLIPIDEMIA: ICD-10-CM

## 2020-06-24 DIAGNOSIS — E66.09 CLASS 1 OBESITY DUE TO EXCESS CALORIES WITH SERIOUS COMORBIDITY AND BODY MASS INDEX (BMI) OF 34.0 TO 34.9 IN ADULT: ICD-10-CM

## 2020-06-24 DIAGNOSIS — I10 ESSENTIAL HYPERTENSION: ICD-10-CM

## 2020-06-24 DIAGNOSIS — R00.2 PALPITATIONS: Primary | ICD-10-CM

## 2020-06-24 DIAGNOSIS — I65.23 ATHEROSCLEROSIS OF BOTH CAROTID ARTERIES: ICD-10-CM

## 2020-06-24 DIAGNOSIS — G47.33 OSA (OBSTRUCTIVE SLEEP APNEA): ICD-10-CM

## 2020-06-24 PROBLEM — R53.82 CHRONIC FATIGUE: Status: RESOLVED | Noted: 2018-05-10 | Resolved: 2020-06-24

## 2020-06-24 PROBLEM — M25.552 CHRONIC LEFT HIP PAIN: Status: RESOLVED | Noted: 2019-05-14 | Resolved: 2020-06-24

## 2020-06-24 PROBLEM — M25.561 CHRONIC PAIN OF BOTH KNEES: Status: RESOLVED | Noted: 2018-11-12 | Resolved: 2020-06-24

## 2020-06-24 PROBLEM — G89.29 CHRONIC LEFT HIP PAIN: Status: RESOLVED | Noted: 2019-05-14 | Resolved: 2020-06-24

## 2020-06-24 PROBLEM — Z96.642 STATUS POST LEFT HIP REPLACEMENT: Status: RESOLVED | Noted: 2019-07-17 | Resolved: 2020-06-24

## 2020-06-24 PROBLEM — R42 VERTIGO: Status: RESOLVED | Noted: 2019-08-28 | Resolved: 2020-06-24

## 2020-06-24 PROBLEM — G89.29 CHRONIC PAIN OF BOTH KNEES: Status: RESOLVED | Noted: 2018-11-12 | Resolved: 2020-06-24

## 2020-06-24 PROBLEM — R25.2 LEG CRAMPS: Status: RESOLVED | Noted: 2019-05-14 | Resolved: 2020-06-24

## 2020-06-24 PROBLEM — M25.50 MULTIPLE JOINT PAIN: Status: RESOLVED | Noted: 2017-06-15 | Resolved: 2020-06-24

## 2020-06-24 PROBLEM — M62.838 NECK MUSCLE SPASM: Status: RESOLVED | Noted: 2019-05-14 | Resolved: 2020-06-24

## 2020-06-24 PROBLEM — M25.562 CHRONIC PAIN OF BOTH KNEES: Status: RESOLVED | Noted: 2018-11-12 | Resolved: 2020-06-24

## 2020-06-24 PROBLEM — M62.838 MUSCLE SPASM: Status: RESOLVED | Noted: 2018-11-12 | Resolved: 2020-06-24

## 2020-06-24 PROBLEM — I00 RHEUMATIC FEVER: Status: RESOLVED | Noted: 2018-09-25 | Resolved: 2020-06-24

## 2020-06-24 PROBLEM — M16.12 PRIMARY OSTEOARTHRITIS OF LEFT HIP: Status: RESOLVED | Noted: 2019-05-24 | Resolved: 2020-06-24

## 2020-06-24 PROCEDURE — 99214 OFFICE O/P EST MOD 30 MIN: CPT | Performed by: NURSE PRACTITIONER

## 2020-06-24 RX ORDER — MULTIVITAMIN WITH IRON
250 TABLET ORAL DAILY
COMMUNITY

## 2020-06-24 NOTE — PROGRESS NOTES
Telehealth Visit    Date of Visit: 2020  Encounter Provider: CARLIE Levy  Place of Service: [facility] CARDIOLOGY  Patient Name: Valery Zabala  :1950  Primary Cardiologist: Dr. Luanne Levin    Chief Complaint   Patient presents with   • Hypertension   :     Dear Dr. Manning,    HPI: Valery Zabala is a pleasant 70 y.o. female who is an established patient of our practice. Due to COVID-19 virus, I am conducting a telehealth visit via video with patient and she has consented to this visit today.      She has a history of rheumatic fever, hypertension, GERD, hyperlipidemia, palpitations, and sleep apnea (compliant with Cpap machine).     In 2017, she had vascular screenings completed which showed the following: mild bilateral carotid disease, normal abdominal aorta, and GERALDO testing normal.    In 2017, she had a normal nuclear stress test and echocardiogram.      In May 2018, she had a carotid duplex which showed proximal right internal carotid artery mild stenosis (1%-15%) and plaquing noted in the bilateral carotid bowel.     In 2019, she followed up in the office with Dr. Levin.  She reported fleeting dyspnea, occasional palpitations, hip pain from osteoarthritis, and superficial thrombophlebitis.  Dr. Levin recommended repeating the echocardiogram for assessment of the bicuspid aortic valve and palpitations.  Echocardiogram completed 3/29/2019 showed the following: EF 68%, aortic valve sclerosis without stenosis, trace to mild mitral valve regurgitation and trace tricuspid valve regurgitation.    Today is her follow-up visit.    I reviewed her last blood work from 2019: CBC showed hemoglobin of 11.5 and hematocrit 34.8.  CMP normal.  Total cholesterol 175, triglycerides 71, HDL 45, and .  TSH normal.    Past Medical History:   Diagnosis Date   • Atherosclerosis of both carotid arteries 2018    carotid duplex  6/2018: mild right proximal stenosis; left normal; plaque bilateral carotid bowel    • Essential hypertension    • Fatigue    • Frequent headaches    • GERD (gastroesophageal reflux disease)    • History of anemia    • Hx of colonic polyps    • Hyperlipidemia    • Hypertension    • Insomnia    • Irritable bowel syndrome with constipation    • Localized edema    • Multiple joint pain    • MARANDA (obstructive sleep apnea) 06/15/2010    Overnight polysomnogram with weight 238 pounds.  AHI mildly abnormal 7 events per hour.  No REM sleep noted.  No sleep-related hypoxia noted.  The patient is adequately treated with auto BiPAP.   • Palpitations    • Primary insomnia    • Primary osteoarthritis of left hip 5/24/2019    Overview:  Added automatically from request for surgery [id number]   • Rheumatic fever    • Sleep disturbance    • Vertigo 8/28/2019   • Vitamin D deficiency        Past Surgical History:   Procedure Laterality Date   • APPENDECTOMY     • ARTERIOVENOUS FISTULA REPAIR  1986   • EYE SURGERY     • HYSTERECTOMY  1994   • REPLACEMENT TOTAL KNEE Right 02/2019   • SHOULDER SURGERY  1997   • TONSILLECTOMY     • TOTAL HIP ARTHROPLASTY Left 07/2019       Social History     Socioeconomic History   • Marital status:      Spouse name: Not on file   • Number of children: Not on file   • Years of education: Not on file   • Highest education level: Not on file   Tobacco Use   • Smoking status: Former Smoker     Packs/day: 0.25     Years: 1.00     Pack years: 0.25     Types: Cigarettes   • Smokeless tobacco: Never Used   Substance and Sexual Activity   • Alcohol use: Yes     Alcohol/week: 2.0 standard drinks     Types: 1 Cans of beer, 1 Shots of liquor per week     Comment: caffeine use: 1 cup daily   • Drug use: No   • Sexual activity: Never       Family History   Problem Relation Age of Onset   • Thyroid disease Mother    • Heart disease Mother    • Stroke Mother    • Hypertension Mother    • Heart disease Father    •  Hypertension Sister    • Stroke Maternal Grandmother    • Diabetes Neg Hx        The following portion of the patient's history were reviewed and updated as appropriate: past medical history, past surgical history, past social history, past family history, allergies, current medications, and problem list.    Review of Systems   Constitution: Negative.   Cardiovascular: Positive for leg swelling and palpitations. Negative for chest pain, dyspnea on exertion, orthopnea, paroxysmal nocturnal dyspnea and syncope.   Musculoskeletal: Positive for joint pain.   Neurological: Negative.        Allergies   Allergen Reactions   • Gentamicin Anaphylaxis   • Vancomycin Anaphylaxis   • Penicillins    • Ceclor [Cefaclor] GI Intolerance   • Effexor [Venlafaxine] Unknown (See Comments)     Unknown reaction   • Fentanyl Rash     Flushing    • Keflex [Cephalexin] GI Intolerance   • Maxzide [Hydrochlorothiazide W-Triamterene] Other (See Comments)     Racing heartbeat   • Neurontin [Gabapentin] Unknown (See Comments)     Unknown reaction   • Sulfa Antibiotics GI Intolerance   • Tetracyclines & Related GI Intolerance   • Zoloft [Sertraline Hcl] Unknown (See Comments)     Unknown reaction         Current Outpatient Medications:   •  acyclovir (ZOVIRAX) 200 MG capsule, TAKE 2 CAPSULES DAILY, Disp: 180 capsule, Rfl: 4  •  ALTACE 10 MG capsule, TAKE 1 CAPSULE DAILY, Disp: 90 capsule, Rfl: 4  •  BYSTOLIC 5 MG tablet, TAKE ONE-HALF (1/2) TABLET DAILY, Disp: 45 tablet, Rfl: 0  •  cholecalciferol (VITAMIN D3) 1000 UNITS tablet, Take 2,000 Units by mouth daily., Disp: , Rfl:   •  cycloSPORINE (RESTASIS) 0.05 % ophthalmic emulsion, 1 drop 2 (two) times a day., Disp: , Rfl:   •  doxepin (SINEquan) 25 MG capsule, TAKE 1 CAPSULE DAILY, Disp: 90 capsule, Rfl: 4  •  fluticasone (FLONASE) 50 MCG/ACT nasal spray, USE 2 SPRAYS IN EACH NOSTRIL ( AS EACH DOSE ) DAILY, Disp: 48 g, Rfl: 2  •  Loratadine (CLARITIN) 10 MG capsule, Take 1 capsule by mouth  "Daily., Disp: , Rfl:   •  Magnesium 250 MG tablet, Take 250 mg by mouth Daily., Disp: , Rfl:   •  naproxen sodium (ALEVE) 220 MG tablet, Take 220 mg by mouth 2 (Two) Times a Day With Meals., Disp: , Rfl:   •  omeprazole (priLOSEC) 20 MG capsule, Take 20 mg by mouth Daily., Disp: , Rfl:         Objective:     Vitals:    06/24/20 0933   BP: 99/65   BP Location: Left arm   Pulse: 74   Weight: 112 kg (247 lb)   Height: 180.3 cm (71\")     Body mass index is 34.45 kg/m².    Due to telehealth visit, there was no EKG, vitals, or weight performed in our office.  Vitals/Weight were reported by the patient and conducted at home.     PHYSICAL EXAM:    Vitals Reviewed  General Appearance: No acute distress, well developed and well nourished.  Obese.  Eyes: Conjunctivae and lids: No erythema, swelling, or discharge. Sclerae anicteric.  Wears glasses.  HENT: Atraumatic, normocephalic. External eyes, ears, and nose normal. No hearing loss noted. Mucous membranes normal. Lips not cyanotic.   Neck: Symmetrical and no evidence of mass.  Respiratory: No signs of respiratory distress. Respiration rhythm and depth normal.   Musculoskeletal: Normal movement of extremities.  Skin: General appearance normal. No pallor, cyanosis, diaphoresis.   Psychiatric: Patient alert and oriented to person, place, and time. Speech and behavior appropriate. Normal mood and affect.        Assessment:       Diagnosis Plan   1. Palpitations     2. Essential hypertension     3. Other hyperlipidemia     4. Atherosclerosis of both carotid arteries     5. MARANDA (obstructive sleep apnea)     6. Class 1 obesity due to excess calories with serious comorbidity and body mass index (BMI) of 34.0 to 34.9 in adult            Plan:       1.  Palpitations: She has had palpitations for her entire life and she is feels that they are very stable.  She will have a brief palpitation on a rare basis that is never sustained.  I have asked her to call if they become more " frequent.    2.  Hypertension: Blood pressure is under excellent control.  Today her blood pressure is 99/65 and she feels great.    3.  Hyperlipidemia: Followed by her PCP.    4.  Carotid Atherosclerosis: Recheck carotid duplex in 2022.    5.  Obstructive Sleep Apnea: Compliant with CPAP machine.    6.  Obesity: BMI is 34.5.  She is starting to exercise more and would like to lose weight.    7.  Overall I feel that she is stable from a cardiac standpoint and I recommended follow-up with Dr. Levin in 1 year, unless otherwise needed sooner.  Feel that she is going to have left knee surgery this year and from a cardiac standpoint I feel that she is at acceptable risk to proceed with surgery.    This patient has consented to a telehealth visit via video. The visit was scheduled as a video visit to comply with patient safety concerns in accordance with CDC recommendations.  All vitals recorded within this visit are reported by the patient.  I spent 28 minutes in total including but not limited to the team minutes spent in direct conversation with this patient.      As always, it has been a pleasure to participate in your patient's care. Thank you.       Sincerely,         CARLIE Graham        Dictated utilizing Dragon dictation

## 2020-07-07 VITALS — HEIGHT: 72 IN | WEIGHT: 250 LBS

## 2020-07-08 ENCOUNTER — OFFICE (AMBULATORY)
Dept: URBAN - METROPOLITAN AREA CLINIC 75 | Facility: CLINIC | Age: 70
End: 2020-07-08

## 2020-07-08 DIAGNOSIS — T50.905A ADVERSE EFFECT OF UNSPECIFIED DRUGS, MEDICAMENTS AND BIOLOGI: ICD-10-CM

## 2020-07-08 DIAGNOSIS — K64.4 RESIDUAL HEMORRHOIDAL SKIN TAGS: ICD-10-CM

## 2020-07-08 DIAGNOSIS — K57.30 DIVERTICULOSIS OF LARGE INTESTINE WITHOUT PERFORATION OR ABS: ICD-10-CM

## 2020-07-08 DIAGNOSIS — K64.8 OTHER HEMORRHOIDS: ICD-10-CM

## 2020-07-08 DIAGNOSIS — K29.70 GASTRITIS, UNSPECIFIED, WITHOUT BLEEDING: ICD-10-CM

## 2020-07-08 DIAGNOSIS — R13.12 DYSPHAGIA, OROPHARYNGEAL PHASE: ICD-10-CM

## 2020-07-08 DIAGNOSIS — K21.9 GASTRO-ESOPHAGEAL REFLUX DISEASE WITHOUT ESOPHAGITIS: ICD-10-CM

## 2020-07-08 DIAGNOSIS — Z86.010 PERSONAL HISTORY OF COLONIC POLYPS: ICD-10-CM

## 2020-07-08 DIAGNOSIS — K44.9 DIAPHRAGMATIC HERNIA WITHOUT OBSTRUCTION OR GANGRENE: ICD-10-CM

## 2020-07-08 DIAGNOSIS — Z12.11 ENCOUNTER FOR SCREENING FOR MALIGNANT NEOPLASM OF COLON: ICD-10-CM

## 2020-07-08 PROCEDURE — 99213 OFFICE O/P EST LOW 20 MIN: CPT | Mod: 95 | Performed by: NURSE PRACTITIONER

## 2020-07-20 ENCOUNTER — TELEPHONE (OUTPATIENT)
Dept: CARDIOLOGY | Facility: CLINIC | Age: 70
End: 2020-07-20

## 2020-07-20 NOTE — TELEPHONE ENCOUNTER
I received a surgical clearance form from Dr. Pietro Toribio.  Patient is scheduled for total knee replacement on 8/17/2020 and they are requesting perioperative cardiac risk assessment.    I spoke with the patient via telephone.  She denies chest pain, shortness of breath, palpitations, or dizziness. Patient is considered at acceptable risk for surgery from a cardiovascular standpoint. According to the Reynaldo's Revised Cardiac Risk Index, patient is considered very low risk (0.4%) of adverse cardiovascular events occurring with moderate risk surgery.     She verbalized understanding.

## 2020-08-05 ENCOUNTER — DOCUMENTATION (OUTPATIENT)
Dept: SLEEP MEDICINE | Facility: HOSPITAL | Age: 70
End: 2020-08-05

## 2020-08-05 NOTE — PROGRESS NOTES
naveed Zabala  Appointment: 2020 1:20 PM  Location: [facility]  Patient #: [id number]  : 1950   / Language: English / Race: White  Female      History of Present Illness (Alanna SEXTON; 2020 9:13 AM)  The patient is a 70 year old female who presents with a sleep disorder.Valery is new to [facility]. She is a patient of Dr Marrero from the sleep lab. She is being seen today through [facility] via telemedicine for her annual f/u. She is doing great on the BIPAP. Her sleep schedule is 11pm-7am. She falls asleep within less than 1 hour and wakes up feeling rested in the morning. She has no snoring or gasping respirations on the device. She wakes up rested. Uses nasal pillows which fit well. No leak. DME is [facility]. Her device is not on a modem and I do not have an updated d/l. I advised pt to mail card here for review. I reviewed prior sleep lab records. She was diagnosed with mild MARANDA with AHI 7 in 2010. Since then she lost about 45 lbs, but continues to require her BIPAP despite weight loss. She can tell a big difference if she does not use it.      Problem List/Past Medical (Abigail Jacques; 2020 1:19 PM)  Atherosclerosis of both carotid arteries (I65.23)    Hypertension (I10)    Fatigue (R53.83)    Frequent headaches (R51)    GERD (gastroesophageal reflux disease) (K21.9)    Anemia (D64.9)    Hyperlipidemia (E78.5)    Insomnia (G47.00)    Irritable bowel syndrome with constipation (K58.1)    Localized edema (R60.0)    MARANDA (obstructive sleep apnea) (G47.33)    Palpitations (R00.2)    Osteoarthritis (M19.90)    Rheumatic fever (I00)    Sleep disturbance (G47.9)    Vertigo (R42)    Vitamin D deficiency (E55.9)    Obesity (E66.9)    Anxiety and depression (F41.9, F32.9)    Bilateral carotid bruits (R09.89)    Problems Reconciled      Past Surgical History (Abigail Jacques; 2020 1:19 PM)  Appendectomy    Arteriovenous Fistula Repair    Eye Surgery    Hysterectomy    Total Knee  Replacement - Right    Shoulder Surgery    Tonsillectomy    Total Hip Replacement - Left      Allergies (Abigail Jacques; 8/4/2020 1:21 PM)  Penicillins   Not Specified  Ceclor *CEPHALOSPORINS*   GI Intolerance  Vancomycin HCl *ANTI-INFECTIVE AGENTS - MISC.*   Anaphylaxis.  Gentamicin Sulfate *AMINOGLYCOSIDES*   Anaphylaxis.  Venlafaxine HCl *ANTIDEPRESSANTS*   Unknown Reaction  fentaNYL *ANALGESICS - OPIOID*   Rash. Flushing  Keflex *CEPHALOSPORINS*   GI Intolerance  Maxzide *DIURETICS*   Irregular heart rate.  Gabapentin *ANTICONVULSANTS*   Unknown Reaction  Sulfa Antibiotics   GI Intolerance  Tetracyclines & Related   GI Intolerance  Zoloft *ANTIDEPRESSANTS*   Unknown Reaction  Allergies Reconciled      Medication History (Abigail Jacques; 8/4/2020 1:23 PM)  Acyclovir  (200MG Capsule, 2 Oral daily) Active.  Altace  (10MG Capsule, 1 Oral daily) Active.  Vitamin D3  (25 MCG(1000 UT) Tablet, 2 Oral daily) Active.  cycloSPORINE  (0.05% Emulsion, 1 drop Ophthalmic two times daily) Active. (AKA: Restasis)  Flonase  (50MCG/ACT Suspension, 2 sprays each nostril Nasal daily) Active.  Loratadine  (10MG Capsule, 1 Oral daily) Active.  Magnesium  (250MG Tablet, 1 Oral daily) Active.  Naproxen Sodium  (220MG Tablet, 1 Oral two times daily) Active.  Omeprazole  (20MG Capsule DR, 1 Oral daily) Active.  Bystolic  (5MG Tablet, 1/2 Oral daily) Active.  Doxepin HCl  (25MG Capsule, 1 Oral daily) Active.  Laxative  (1 Oral as needed) Specific strength unknown - Active.  Medications Reconciled     Social History (Abigail Jacques; 8/4/2020 1:19 PM)  Tobacco Use   Never smoker. [employer] 08/01/2020  Alcohol use   Moderate alcohol use. [employer] 08/01/2020  No drug use   [employer] 08/01/2020  Highest Education Level Attained   College graduate. [employer] 08/01/2020  Exercise   1 x week. [employer] 08/01/2020  Caffeine use   1 cup daily  Marital status   .    Family History (Abigail Jacques; 8/4/2020 1:19 PM)  Cancer   Mother, Father.  [employer] 08/01/2020  COPD   Mother. [employer] 08/01/2020  Heart Disease   Father, Mother. [employer] 08/01/2020  Hypertension   Mother, Sister. [employer] 08/01/2020  Sleep Apnea   Father. [employer] 08/01/2020  Transient ischemic attack   Mother. [employer] 08/01/2020  Cerebrovascular Accident   Mother, Maternal Grandmother.    Health Maintenance History (Abigail Jacques; 8/4/2020 1:24 PM)  Flu Vaccine   [07/31/2020]: Performed. 10/01/2019  Pneumovax   [07/31/2020]: Performed. 03/03/2018  Prevnar 13   [07/31/2020]: Performed. 01/01/2017  DME Company   [08/04/2020]: [employer] [facility]. Nasal Pillow    Travel History (Abigail Jacques; 8/4/2020 1:24 PM)  None   [08/04/2020]:        Review of Systems (Pito Parsonsia; 08/01/2020 1:45 PM)  General Not Present- Chills, Fever, Night Sweats, Poor Appetite and Weight Loss.  Skin Not Present- Nail Changes and Rash.  HEENT Not Present- Ear Pain, Eye Pain, Hoarseness, Nasal Congestion, Post Nasal Drip, Recurrent Nose Bleeds and Sores in Mouth.  Respiratory Not Present- Awakens Exhausted, Bloody sputum, Chest Discomfort/Tightness, Cough, Excessive Daytime Sleepiness, Shortness of Breath, Snoring, Trouble Falling Asleep, Wakes up from Sleep Wheezing or Short of Breath and Wheezing.  Cardiovascular Present- Swollen Ankles or Legs. Not Present- Chest Pain, Difficulty Breathing Lying Down and Palpitations.  Gastrointestinal Not Present- Belching, Difficulty Swallowing, Frequent Heartburn and Indigestion.  Male Genitourinary Not Present- Difficulty Emptying Bladder and Frequent Urination.  Female Genitourinary Not Present- Difficulty Emptying Bladder, Frequent Urination and Pregnancy.  Musculoskeletal Present- Joint Swelling. Not Present- Muscle Weakness.  Neurological Not Present- Difficulty Speaking and Seizures.  Psychiatric Not Present- Anxiety and Depression.  Endocrine Not Present- Cold Intolerance and Excessive Sweating.  Hematology Not Present- Enlarged Lymph Nodes and  Excessive bleeding.    Vitals (Abigail Jacques; 8/4/2020 1:26 PM)  8/4/2020 1:24 PM  Weight: 247 lb   Height: 71 in  Neck: 16.5 in     Weight was reported by patient.  Height was reported by patient.  Body Surface Area: 2.31 m²   Body Mass Index: 34.45 kg/m²          Physical Exam (Alanna SEXTON; 8/5/2020 9:10 AM)  General  General Appearance - Cooperative.  Build & Nutrition - Well developed.  Oxygen Therapy - Breathing Room Air.  Mental Status - Alert.    Integumentary  General Characteristics  Color - normal coloration of skin.    Chest and Lung Exam  Inspection  Accessory muscles - No use of accessory muscles in breathing.        Assessment & Plan (Alanna SEXTON; 8/5/2020 9:13 AM)    MARANDA (obstructive sleep apnea) (G47.33)  Impression: Advised to mail me her smart card for review. Otherwise, she uses the BIPAP and benefits. Current device is about 4 years old. It works properly. I reviewed prior d/l data as most recent d/l is unavailable yet. Her machine is set at max IPAP 25, min EPAP 4, max pressure support 8, minimum pressure support of 2.      F/u Dr Marrero 1 year at [facility].      Addendum  I received a copy of smart card download dates 5/12/20-8/9/20. 100% use with average nightly use of 7 hours and 31 minutes on auto BIPAP with average pressures of 10/6, AHI 5.2. Settings are : max IPAP 25, min EPAP 4, PS 2-8. I attempted to call pt. Was unable to reach her and left her a v/m. F/u with Dr Marrero in 1 year.       Signed by CARLIE Campo (8/5/2020 9:15 AM)

## 2020-08-10 ENCOUNTER — TELEPHONE (OUTPATIENT)
Dept: SLEEP MEDICINE | Facility: HOSPITAL | Age: 70
End: 2020-08-10

## 2020-08-17 ENCOUNTER — TELEPHONE (OUTPATIENT)
Dept: SLEEP MEDICINE | Facility: HOSPITAL | Age: 70
End: 2020-08-17

## 2020-08-17 RX ORDER — NEBIVOLOL HYDROCHLORIDE 5 MG/1
TABLET ORAL
Qty: 45 TABLET | Refills: 0 | Status: SHIPPED | OUTPATIENT
Start: 2020-08-17 | End: 2020-11-09

## 2020-08-17 NOTE — TELEPHONE ENCOUNTER
----- Message from CARLIE Campo sent at 8/17/2020 10:10 AM EDT -----  I reviewed d/l as below and called pt to discuss. I was unable to reach her. Left her a v/m. Keep pressures same.    RTC 1 year to see Dr. Marrero.    ----- Message -----  From: Alanna Ambrosio APRN  Sent: 8/13/2020   4:32 PM EDT  To: CARLIE Campo    Download dates 5/12/20-8/9/20- 100% use with average use of 7 hours and 31 minutes on auto BIPAP with avg pr 10/6, AHI 5.2.      Need to give pt a call to discuss data.

## 2020-11-09 RX ORDER — NEBIVOLOL HYDROCHLORIDE 5 MG/1
TABLET ORAL
Qty: 45 TABLET | Refills: 3 | Status: SHIPPED | OUTPATIENT
Start: 2020-11-09 | End: 2021-10-15

## 2021-04-26 ENCOUNTER — TRANSCRIBE ORDERS (OUTPATIENT)
Dept: ADMINISTRATIVE | Facility: HOSPITAL | Age: 71
End: 2021-04-26

## 2021-04-26 DIAGNOSIS — Z78.0 POST-MENOPAUSAL: Primary | ICD-10-CM

## 2021-08-04 ENCOUNTER — HOSPITAL ENCOUNTER (OUTPATIENT)
Dept: BONE DENSITY | Facility: HOSPITAL | Age: 71
Discharge: HOME | End: 2021-08-04
Admitting: INTERNAL MEDICINE

## 2021-08-04 DIAGNOSIS — Z78.0 POST-MENOPAUSAL: ICD-10-CM

## 2021-08-04 PROCEDURE — 77080 DXA BONE DENSITY AXIAL: CPT

## 2021-09-08 ENCOUNTER — OFFICE VISIT (OUTPATIENT)
Dept: SLEEP MEDICINE | Facility: HOSPITAL | Age: 71
End: 2021-09-08

## 2021-09-08 VITALS — HEIGHT: 71 IN | BODY MASS INDEX: 27.16 KG/M2 | WEIGHT: 194 LBS

## 2021-09-08 DIAGNOSIS — G47.33 OSA (OBSTRUCTIVE SLEEP APNEA): Primary | ICD-10-CM

## 2021-09-08 PROCEDURE — G0463 HOSPITAL OUTPT CLINIC VISIT: HCPCS

## 2021-09-08 PROCEDURE — 99213 OFFICE O/P EST LOW 20 MIN: CPT | Performed by: INTERNAL MEDICINE

## 2021-09-08 NOTE — PROGRESS NOTES
"Follow Up Sleep Disorders Center Note     Chief Complaint:  MARANDA     Primary Care Physician: Kimberly Manning MD    Interval History:   The patient is a 71 y.o. female  who I last saw 9/18/2019 and that note was reviewed.  Because of the pandemic, the patient did have a telehealth visit 8/5/2020.  The patient is here today for follow-up.  She states she is stable without new complaints.  She goes to bed at 10:30 PM and awakens between 6:30 AM and 7 AM.  The patient awakens to use the restroom and her  may be restless.    Self-administered Austin Sleepiness Scale test results: 1  0-5 Lower normal daytime sleepiness  6-10 Higher normal daytime sleepiness  11-12 Mild, 13-15 Moderate, & 16-24 Severe excessive daytime sleepiness    Review of Systems:    A complete review of systems was done and all were negative with the exception of the above    Social History:    Social History     Socioeconomic History   • Marital status:      Spouse name: Not on file   • Number of children: Not on file   • Years of education: Not on file   • Highest education level: Not on file   Tobacco Use   • Smoking status: Former Smoker     Packs/day: 0.25     Years: 1.00     Pack years: 0.25     Types: Cigarettes   • Smokeless tobacco: Never Used   Substance and Sexual Activity   • Alcohol use: Yes     Alcohol/week: 2.0 standard drinks     Types: 1 Cans of beer, 1 Shots of liquor per week     Comment: caffeine use: 1 cup daily   • Drug use: No   • Sexual activity: Never       Allergies:  Gentamicin, Vancomycin, Penicillins, Ceclor [cefaclor], Effexor [venlafaxine], Fentanyl, Keflex [cephalexin], Maxzide [hydrochlorothiazide w-triamterene], Neurontin [gabapentin], Sulfa antibiotics, Tetracyclines & related, and Zoloft [sertraline hcl]     Medication Review: Her list was reviewed.      Vital Signs:    Vitals:    09/08/21 0900   Weight: 88 kg (194 lb)   Height: 180.3 cm (71\")     Body mass index is 27.06 kg/m².    Physical Exam:  "   Constitutional:  Well developed 71 y.o. female that appears in no apparent distress.  Awake & oriented times 3.  Normal mood with normal recent and remote memory and normal judgement.  Eyes:  Conjunctivae normal.  Oropharynx: Previously, moist mucous membranes without exudate and a large tongue and normal uvula and patent posterior pharyngeal opening and class II Mallampati airway, patient is wearing a facemask.     Downloaded PAP Data Reviewed For Compliance:  DME is [facility] and she uses nasal pillows.  Downloads between 6/10 and 9/7/2021 compliance 100%.  Average usage is 7 hours and 56 minutes.  Average AHI is normal without leak.  Average auto BiPAP pressure is IPAP of 9.6 and EPAP of 5.8 and her auto BiPAP settings: Max IPAP 25 minimum EPAP 4 maximum pressure support 8 minimum pressure support of 2.    I have reviewed the above results and compared them with the patient's last downloads and reviewed with the patient.    Impression:   Obstructive sleep apnea adequately treated with auto BiPAP. The patient appears to be at goal with good compliance and usage. The patient has no complaints of hypersomnolence.    Plan:  Good sleep hygiene measures should be maintained.  Some further weight loss would be beneficial in this patient who is overweight by BMI.      After evaluating the patient and assessing results available, the patient is benefiting from the treatment being provided.     The patient will continue auto BiPAP.  After clinical evaluation and review of downloads, I recommend no changes to the patient's pressures.  A new prescription will be sent to the patient's DME.    [facility] recall discussed.  The patient has registered her device.  She does not use an ozone .Humidity should be utilized.    I answered all of the patient's questions.  The patient will call for any problems and will follow up in 1 year.      Jorge Marrero MD  Sleep Medicine  09/08/21  10:06 EDT

## 2021-09-24 ENCOUNTER — TELEPHONE (OUTPATIENT)
Dept: SLEEP MEDICINE | Facility: HOSPITAL | Age: 71
End: 2021-09-24

## 2021-09-24 NOTE — TELEPHONE ENCOUNTER
LV informing patient of Doctors not to see if she wanted a new CPAP due to the recall and her machine being over 5 year . If she would want a new device , what DME would she want to use ? It looks a though she was using [employer]/ [facility]. Want to clarify ..

## 2021-10-15 RX ORDER — NEBIVOLOL HYDROCHLORIDE 5 MG/1
TABLET ORAL
Qty: 45 TABLET | Refills: 0 | Status: SHIPPED | OUTPATIENT
Start: 2021-10-15 | End: 2021-11-18

## 2021-11-18 ENCOUNTER — OFFICE VISIT (OUTPATIENT)
Dept: CARDIOLOGY | Facility: CLINIC | Age: 71
End: 2021-11-18

## 2021-11-18 VITALS
SYSTOLIC BLOOD PRESSURE: 120 MMHG | DIASTOLIC BLOOD PRESSURE: 60 MMHG | BODY MASS INDEX: 26.1 KG/M2 | HEART RATE: 67 BPM | HEIGHT: 71 IN | WEIGHT: 186.4 LBS

## 2021-11-18 DIAGNOSIS — G47.33 OSA (OBSTRUCTIVE SLEEP APNEA): ICD-10-CM

## 2021-11-18 DIAGNOSIS — I10 ESSENTIAL HYPERTENSION: ICD-10-CM

## 2021-11-18 DIAGNOSIS — E78.49 OTHER HYPERLIPIDEMIA: Primary | ICD-10-CM

## 2021-11-18 DIAGNOSIS — I65.23 BILATERAL CAROTID ARTERY STENOSIS: ICD-10-CM

## 2021-11-18 PROCEDURE — 99214 OFFICE O/P EST MOD 30 MIN: CPT | Performed by: INTERNAL MEDICINE

## 2021-11-18 PROCEDURE — 93000 ELECTROCARDIOGRAM COMPLETE: CPT | Performed by: INTERNAL MEDICINE

## 2021-11-18 RX ORDER — RAMIPRIL 10 MG/1
10 CAPSULE ORAL DAILY
Qty: 90 CAPSULE | Refills: 3 | Status: SHIPPED | OUTPATIENT
Start: 2021-11-18

## 2021-11-18 NOTE — PROGRESS NOTES
Date of Office Visit: 2021  Encounter Provider: Luanne Levin MD  Place of Service: [facility] CARDIOLOGY  Patient Name: Valery Zabala  :1950      Patient ID:  Valery Zabala is a 71 y.o. female is here for  followup for hypertension and hyperlipidemia.         History of Present Illness    She came in for chest pain.  She had a nonischemic stress nuclear perfusion study and normal echocardiogram done 2017     She has obstructive sleep apnea and uses a BiPAP for that.  She's had a history of rheumatic fever.  She has irritable bowel syndrome and gastroscope reflux disease.  She will of Protonix because of long-term issues with osteoporosis.  She takes Linzess for the irritable bowel syndrome.  She was taking Zantac but had some vomiting with this.  When she lost weight her GERD got better and the vomiting got better.        She does have constant knee pain for which she gets steroid injections because of the severe valgus abnormality.  She has rare alcohol and does not smoke.  She is  has 2 stepchildren.     Her father had coronary bypass grafting.  Her mother had arrhythmia.  Her maternal grandfather  myocardial infarction in his 70s.     She vascular screening done 2017 and this did show mild bilateral carotid disease.  Her abdominal aorta was normal and her peripheral vasculature was normal.     She is on Altace and Bystolic for hypertension.  She had a full carotid duplex dated on 2018 showing mild bilateral carotid artery stenosis.  I did pull up her echocardiographic images today from 2017.  Aortic valve looks like it is bicuspid with fusion of the non-and left coronary cusps.  It does not look stenotic.  Echo done 3/29/2019 shows trileaflet aortic valve with aortic sclerosis, ejection action 60%, no significant valvular abnormalities.     Labs on 2021 show normal CBC, normal CMP, normal TSH, normal vitamin D, total cholesterol  46, and triglycerides 97, HDL 40, .    She has lost 70 pounds.  Over Covid, she had bilateral knee replacements and hip replacement.  She checks her blood pressure at home and sometimes it runs low but she is had no dizziness or syncope.  She has no exertional chest tightness or pressure.  Occasionally she has palpitations.  She has no orthopnea or PND.  She has no exertional dyspnea.  She is walking a mile and a half daily and feels great.  She is taking her medications as directed without difficulty.  We have never treated her with statin therapies because she wants to control her lipids with lifestyle.    Past Medical History:   Diagnosis Date   • Atherosclerosis of both carotid arteries 05/21/2018    carotid duplex 6/2018: mild right proximal stenosis; left normal; plaque bilateral carotid bowel    • Essential hypertension    • Fatigue    • Frequent headaches    • GERD (gastroesophageal reflux disease)    • History of anemia    • Hx of colonic polyps    • Hyperlipidemia    • Hypertension    • Insomnia    • Irritable bowel syndrome with constipation    • Localized edema    • Multiple joint pain    • MARANDA (obstructive sleep apnea) 06/15/2010    Overnight polysomnogram with weight 238 pounds.  AHI mildly abnormal 7 events per hour.  No REM sleep noted.  No sleep-related hypoxia noted.  The patient is adequately treated with auto BiPAP.   • Palpitations    • Primary insomnia    • Primary osteoarthritis of left hip 5/24/2019    Overview:  Added automatically from request for surgery [id number]   • Rheumatic fever    • Sleep disturbance    • Vertigo 8/28/2019   • Vitamin D deficiency          Past Surgical History:   Procedure Laterality Date   • APPENDECTOMY     • ARTERIOVENOUS FISTULA REPAIR  1986   • EYE SURGERY     • HYSTERECTOMY  1994   • REPLACEMENT TOTAL KNEE Right 02/2019   • SHOULDER SURGERY  1997   • TONSILLECTOMY     • TOTAL HIP ARTHROPLASTY Left 07/2019       Current Outpatient Medications on File Prior  "to Visit   Medication Sig Dispense Refill   • acyclovir (ZOVIRAX) 200 MG capsule TAKE 2 CAPSULES DAILY 180 capsule 4   • cholecalciferol (VITAMIN D3) 1000 UNITS tablet Take 2,000 Units by mouth daily.     • cycloSPORINE (RESTASIS) 0.05 % ophthalmic emulsion 1 drop 2 (two) times a day.     • doxepin (SINEquan) 25 MG capsule TAKE 1 CAPSULE DAILY 90 capsule 4   • fluticasone (FLONASE) 50 MCG/ACT nasal spray USE 2 SPRAYS IN EACH NOSTRIL ( AS EACH DOSE ) DAILY 48 g 2   • Loratadine (CLARITIN) 10 MG capsule Take 1 capsule by mouth Daily.     • Magnesium 250 MG tablet Take 250 mg by mouth Daily.     • naproxen sodium (ALEVE) 220 MG tablet Take 220 mg by mouth 2 (Two) Times a Day With Meals.     • omeprazole (priLOSEC) 20 MG capsule Take 20 mg by mouth Daily.     • [DISCONTINUED] ALTACE 10 MG capsule TAKE 1 CAPSULE DAILY 90 capsule 4   • [DISCONTINUED] Bystolic 5 MG tablet TAKE ONE-HALF (1/2) TABLET DAILY 45 tablet 0     No current facility-administered medications on file prior to visit.       Social History     Socioeconomic History   • Marital status:    Tobacco Use   • Smoking status: Former Smoker     Packs/day: 0.50     Years: 1.00     Pack years: 0.50     Types: Cigarettes   • Smokeless tobacco: Never Used   Substance and Sexual Activity   • Alcohol use: Yes     Alcohol/week: 2.0 standard drinks     Types: 1 Cans of beer, 1 Shots of liquor per week     Comment: caffeine use: 1 cup daily   • Drug use: No   • Sexual activity: Never           ROS    Procedures    ECG 12 Lead    Date/Time: 11/18/2021 7:48 AM  Performed by: Luanne Levin MD  Authorized by: Luanne Levin MD   Comparison: compared with previous ECG   Similar to previous ECG  Rhythm: sinus rhythm    Clinical impression: normal ECG                Objective:      Vitals:    11/18/21 0740 11/18/21 0744   BP: 124/66 120/60   BP Location: Left arm Right arm   Pulse: 67    Weight: 84.6 kg (186 lb 6.4 oz)    Height: 180.3 cm (71\")      Body " mass index is 26 kg/m².    Vitals reviewed.   Constitutional:       General: Not in acute distress.     Appearance: Well-developed. Not diaphoretic.   Eyes:      General: No scleral icterus.     Conjunctiva/sclera: Conjunctivae normal.   HENT:      Head: Normocephalic and atraumatic.   Neck:      Thyroid: No thyromegaly.      Vascular: No carotid bruit or JVD.      Lymphadenopathy: No cervical adenopathy.   Pulmonary:      Effort: Pulmonary effort is normal. No respiratory distress.      Breath sounds: Normal breath sounds. No wheezing. No rhonchi. No rales.   Chest:      Chest wall: Not tender to palpatation.   Cardiovascular:      Normal rate. Regular rhythm.      Murmurs: There is no murmur.      No gallop.   Pulses:     Intact distal pulses.   Edema:     Peripheral edema absent.   Abdominal:      General: Bowel sounds are normal. There is no distension or abdominal bruit.      Palpations: Abdomen is soft. There is no abdominal mass.      Tenderness: There is no abdominal tenderness.   Musculoskeletal:         General: No deformity.      Extremities: No clubbing present.     Cervical back: Neck supple. Skin:     General: Skin is warm and dry. There is no cyanosis.      Coloration: Skin is not pale.      Findings: No rash.   Neurological:      Mental Status: Alert and oriented to person, place, and time.      Cranial Nerves: No cranial nerve deficit.   Psychiatric:         Judgment: Judgment normal.         Lab Review:       Assessment:      Diagnosis Plan   1. Other hyperlipidemia  CT Cardiac Calcium Score Without Dye   2. MARANDA (obstructive sleep apnea)     3. Bilateral carotid artery stenosis  Duplex Carotid Ultrasound CAR   4. Essential hypertension  ramipril (Altace) 10 MG capsule     1. Chest pain, normal stress nuclear study 8/2017.  No further pain.   2. Hypertension, well controlled.    3. Hyperlipidemia, untreated.   4. Normal EF on echo in 2019.   5. mild bilateral carotid stenosis, last carotid 6/2018.         Plan:       See back in 2 years, set up carotid duplex for mild bilateral carotid artery stenosis.  Stop Bystolic as her blood pressure is well controlled and even low at home.  Continue exercise and weight control.  Set up calcium score as she is a family history of coronary artery disease and has hyperlipidemia which is treated with lifestyle only.    If her calcium score is elevated, would really encourage her to consider statin therapies and we discussed this in the office today.

## 2021-11-22 ENCOUNTER — OFFICE (AMBULATORY)
Dept: URBAN - METROPOLITAN AREA CLINIC 75 | Facility: CLINIC | Age: 71
End: 2021-11-22

## 2021-11-22 VITALS
DIASTOLIC BLOOD PRESSURE: 68 MMHG | HEART RATE: 66 BPM | WEIGHT: 188 LBS | OXYGEN SATURATION: 92 % | SYSTOLIC BLOOD PRESSURE: 140 MMHG | HEIGHT: 72 IN

## 2021-11-22 DIAGNOSIS — K21.9 GASTRO-ESOPHAGEAL REFLUX DISEASE WITHOUT ESOPHAGITIS: ICD-10-CM

## 2021-11-22 PROCEDURE — 99214 OFFICE O/P EST MOD 30 MIN: CPT | Performed by: INTERNAL MEDICINE

## 2021-11-30 ENCOUNTER — HOSPITAL ENCOUNTER (OUTPATIENT)
Dept: CARDIOLOGY | Facility: HOSPITAL | Age: 71
Discharge: HOME | End: 2021-11-30
Admitting: INTERNAL MEDICINE

## 2021-11-30 ENCOUNTER — TELEPHONE (OUTPATIENT)
Dept: CARDIOLOGY | Facility: CLINIC | Age: 71
End: 2021-11-30

## 2021-11-30 DIAGNOSIS — I65.23 BILATERAL CAROTID ARTERY STENOSIS: ICD-10-CM

## 2021-11-30 PROCEDURE — 93880 EXTRACRANIAL BILAT STUDY: CPT | Performed by: INTERNAL MEDICINE

## 2021-11-30 PROCEDURE — 93880 EXTRACRANIAL BILAT STUDY: CPT

## 2021-11-30 NOTE — TELEPHONE ENCOUNTER
Notified pt of results. She verbalized understanding.    Thank you,    Norma Olmos, RN  [place] [facility]

## 2021-12-01 LAB
BH CV XLRA MEAS LEFT DIST CCA EDV: -27.6 CM/SEC
BH CV XLRA MEAS LEFT DIST CCA PSV: -86.2 CM/SEC
BH CV XLRA MEAS LEFT DIST ICA EDV: -37.8 CM/SEC
BH CV XLRA MEAS LEFT DIST ICA PSV: -88.1 CM/SEC
BH CV XLRA MEAS LEFT ICA/CCA RATIO: 1
BH CV XLRA MEAS LEFT MID CCA EDV: -30.9 CM/SEC
BH CV XLRA MEAS LEFT MID CCA PSV: -91.7 CM/SEC
BH CV XLRA MEAS LEFT MID ICA EDV: -27.1 CM/SEC
BH CV XLRA MEAS LEFT MID ICA PSV: -79.4 CM/SEC
BH CV XLRA MEAS LEFT PROX ECA PSV: -111.6 CM/SEC
BH CV XLRA MEAS LEFT PROX ICA EDV: -26.5 CM/SEC
BH CV XLRA MEAS LEFT PROX ICA PSV: -87.3 CM/SEC
BH CV XLRA MEAS LEFT PROX SCLA PSV: 161 CM/SEC
BH CV XLRA MEAS LEFT VERTEBRAL A PSV: -50.4 CM/SEC
BH CV XLRA MEAS RIGHT DIST CCA EDV: -25.7 CM/SEC
BH CV XLRA MEAS RIGHT DIST CCA PSV: -92 CM/SEC
BH CV XLRA MEAS RIGHT DIST ICA EDV: -27.2 CM/SEC
BH CV XLRA MEAS RIGHT DIST ICA PSV: -96.4 CM/SEC
BH CV XLRA MEAS RIGHT ICA/CCA RATIO: 1.5
BH CV XLRA MEAS RIGHT MID CCA EDV: -25.7 CM/SEC
BH CV XLRA MEAS RIGHT MID CCA PSV: -86.2 CM/SEC
BH CV XLRA MEAS RIGHT MID ICA EDV: -37.1 CM/SEC
BH CV XLRA MEAS RIGHT MID ICA PSV: -112 CM/SEC
BH CV XLRA MEAS RIGHT PROX ECA PSV: -157.7 CM/SEC
BH CV XLRA MEAS RIGHT PROX ICA EDV: -41.4 CM/SEC
BH CV XLRA MEAS RIGHT PROX ICA PSV: -137 CM/SEC
BH CV XLRA MEAS RIGHT PROX SCLA PSV: 158 CM/SEC
BH CV XLRA MEAS RIGHT VERTEBRAL A PSV: -55.2 CM/SEC
MAXIMAL PREDICTED HEART RATE: 149 BPM
STRESS TARGET HR: 127 BPM

## 2021-12-03 ENCOUNTER — TELEPHONE (OUTPATIENT)
Dept: CARDIOLOGY | Facility: CLINIC | Age: 71
End: 2021-12-03

## 2021-12-03 DIAGNOSIS — I25.10 CORONARY ARTERY CALCIFICATION: Primary | ICD-10-CM

## 2021-12-03 DIAGNOSIS — E78.49 OTHER HYPERLIPIDEMIA: ICD-10-CM

## 2021-12-03 DIAGNOSIS — I10 ESSENTIAL HYPERTENSION: ICD-10-CM

## 2021-12-03 DIAGNOSIS — I25.84 CORONARY ARTERY CALCIFICATION: Primary | ICD-10-CM

## 2021-12-03 RX ORDER — ROSUVASTATIN CALCIUM 10 MG/1
10 TABLET, COATED ORAL NIGHTLY
Qty: 90 TABLET | Refills: 3 | Status: SHIPPED | OUTPATIENT
Start: 2021-12-03

## 2021-12-03 RX ORDER — ASPIRIN 81 MG/1
81 TABLET ORAL DAILY
Qty: 90 TABLET | Refills: 3 | Status: SHIPPED | OUTPATIENT
Start: 2021-12-03

## 2021-12-03 NOTE — TELEPHONE ENCOUNTER
I called and gave calcium score -very high     Set up treadmill stress and cath after treadmill is done.     Start asa 81mg daily and rosuvastatin 10mg nightly     I ordered all of this.

## 2021-12-07 ENCOUNTER — TRANSCRIBE ORDERS (OUTPATIENT)
Dept: CARDIOLOGY | Facility: CLINIC | Age: 71
End: 2021-12-07

## 2021-12-07 ENCOUNTER — HOSPITAL ENCOUNTER (OUTPATIENT)
Facility: HOSPITAL | Age: 71
Setting detail: HOSPITAL OUTPATIENT SURGERY
End: 2021-12-07
Attending: INTERNAL MEDICINE | Admitting: INTERNAL MEDICINE

## 2021-12-07 DIAGNOSIS — Z01.810 PRE-OPERATIVE CARDIOVASCULAR EXAMINATION: ICD-10-CM

## 2021-12-07 DIAGNOSIS — I25.84 CORONARY ARTERY CALCIFICATION: ICD-10-CM

## 2021-12-07 DIAGNOSIS — Z13.6 SCREENING FOR ISCHEMIC HEART DISEASE: ICD-10-CM

## 2021-12-07 DIAGNOSIS — Z01.818 OTHER SPECIFIED PRE-OPERATIVE EXAMINATION: Primary | ICD-10-CM

## 2021-12-07 DIAGNOSIS — I25.10 CORONARY ARTERY CALCIFICATION: ICD-10-CM

## 2021-12-13 ENCOUNTER — TELEPHONE (OUTPATIENT)
Dept: CARDIOLOGY | Facility: CLINIC | Age: 71
End: 2021-12-13

## 2021-12-13 NOTE — TELEPHONE ENCOUNTER
Dr. Levin,    Pt had brought in rec's for Dr. Ledesma to review, prior to her scheduled Cardiac Cath on 12/16/21.    She called and left  asking for Dr. Ledesma to review her many allergies to abx and metals.    I called pt back to let her know Dr. Ledesma had in fact received the information, and is aware of receiving info.    Pt then mentioned that she had decided to get a 2nd opinion before continuing with a cardiac cath. She wanted to emphasize that it had nothing to do with [facility], she just wanted to check all of her options.    Chelsea,    Can you please cancel the Cardiac Cath for 12/16/21? Along with the Stress Test, and Covid Test?    Thank you,    Alice Bernal, CMA

## 2021-12-14 ENCOUNTER — APPOINTMENT (OUTPATIENT)
Dept: LAB | Facility: HOSPITAL | Age: 71
End: 2021-12-14

## 2022-02-01 VITALS
RESPIRATION RATE: 24 BRPM | OXYGEN SATURATION: 97 % | SYSTOLIC BLOOD PRESSURE: 108 MMHG | SYSTOLIC BLOOD PRESSURE: 114 MMHG | HEIGHT: 72 IN | DIASTOLIC BLOOD PRESSURE: 68 MMHG | RESPIRATION RATE: 20 BRPM | WEIGHT: 185 LBS | DIASTOLIC BLOOD PRESSURE: 83 MMHG | HEART RATE: 86 BPM | TEMPERATURE: 96.8 F | HEART RATE: 83 BPM | SYSTOLIC BLOOD PRESSURE: 128 MMHG | RESPIRATION RATE: 30 BRPM | SYSTOLIC BLOOD PRESSURE: 141 MMHG | TEMPERATURE: 96.2 F | HEART RATE: 74 BPM | RESPIRATION RATE: 14 BRPM | RESPIRATION RATE: 16 BRPM | SYSTOLIC BLOOD PRESSURE: 103 MMHG | DIASTOLIC BLOOD PRESSURE: 58 MMHG | HEART RATE: 72 BPM | RESPIRATION RATE: 21 BRPM | DIASTOLIC BLOOD PRESSURE: 65 MMHG | OXYGEN SATURATION: 98 % | SYSTOLIC BLOOD PRESSURE: 138 MMHG | OXYGEN SATURATION: 100 % | HEART RATE: 99 BPM | HEART RATE: 81 BPM | RESPIRATION RATE: 15 BRPM

## 2022-02-02 ENCOUNTER — OFFICE (AMBULATORY)
Dept: URBAN - METROPOLITAN AREA PATHOLOGY 4 | Facility: PATHOLOGY | Age: 72
End: 2022-02-02

## 2022-02-02 ENCOUNTER — AMBULATORY SURGICAL CENTER (AMBULATORY)
Dept: URBAN - METROPOLITAN AREA SURGERY 17 | Facility: SURGERY | Age: 72
End: 2022-02-02

## 2022-02-02 DIAGNOSIS — K31.7 POLYP OF STOMACH AND DUODENUM: ICD-10-CM

## 2022-02-02 DIAGNOSIS — K21.9 GASTRO-ESOPHAGEAL REFLUX DISEASE WITHOUT ESOPHAGITIS: ICD-10-CM

## 2022-02-02 DIAGNOSIS — K31.89 OTHER DISEASES OF STOMACH AND DUODENUM: ICD-10-CM

## 2022-02-02 LAB
GI HISTOLOGY: A. SELECT: (no result)
GI HISTOLOGY: B. SELECT: (no result)
GI HISTOLOGY: C. SELECT: (no result)
GI HISTOLOGY: D. SELECT: (no result)
GI HISTOLOGY: E. SELECT: (no result)
GI HISTOLOGY: PDF REPORT: (no result)

## 2022-02-02 PROCEDURE — 43239 EGD BIOPSY SINGLE/MULTIPLE: CPT | Performed by: INTERNAL MEDICINE

## 2022-02-02 PROCEDURE — 88305 TISSUE EXAM BY PATHOLOGIST: CPT | Performed by: INTERNAL MEDICINE

## 2022-02-02 NOTE — SERVICEHPINOTES
Ms. Cheung is here for follow-up and doing well.  Since I saw her she is lost about 70 pounds with diet and exercise.  Her reflux has improved.  She really has nocturnal symptoms.  There is no dysphagia, odynophagia nausea or vomiting.  There is no melena or hematemesis.She does have a history of intestinal metaplasia so the plan is to schedule EGD with biopsy for mapping for dysplasia.She also has constipation which she manages with over-the-counter therapies.  She has a history of polyps.  She is up-to-date in terms of colonoscopy.  She'll be due for colonoscopy in 2023.  She is in no acute distress.

## 2022-02-10 ENCOUNTER — TELEPHONE (OUTPATIENT)
Dept: SLEEP MEDICINE | Facility: HOSPITAL | Age: 72
End: 2022-02-10

## 2022-02-10 NOTE — TELEPHONE ENCOUNTER
Patient called requesting order [facility]. . [facility]

## 2022-02-16 ENCOUNTER — TELEPHONE (OUTPATIENT)
Dept: SLEEP MEDICINE | Facility: HOSPITAL | Age: 72
End: 2022-02-16

## 2022-02-16 NOTE — TELEPHONE ENCOUNTER
Unable to fine titration showing patient needs a bipap.. contacted [facility] , looked in old system , reached out to [facility] . No titration or notes that say intolerant to cpap or reason for being put on bipap ..

## 2022-08-08 ENCOUNTER — TELEHEALTH PROVIDED IN PATIENT'S HOME (AMBULATORY)
Dept: URBAN - METROPOLITAN AREA TELEHEALTH 6 | Facility: TELEHEALTH | Age: 72
End: 2022-08-08

## 2022-08-08 VITALS — HEIGHT: 72 IN

## 2022-08-08 DIAGNOSIS — K31.89 OTHER DISEASES OF STOMACH AND DUODENUM: ICD-10-CM

## 2022-08-08 DIAGNOSIS — R13.12 DYSPHAGIA, OROPHARYNGEAL PHASE: ICD-10-CM

## 2022-08-08 DIAGNOSIS — K64.4 RESIDUAL HEMORRHOIDAL SKIN TAGS: ICD-10-CM

## 2022-08-08 DIAGNOSIS — K57.30 DIVERTICULOSIS OF LARGE INTESTINE WITHOUT PERFORATION OR ABS: ICD-10-CM

## 2022-08-08 DIAGNOSIS — K21.9 GASTRO-ESOPHAGEAL REFLUX DISEASE WITHOUT ESOPHAGITIS: ICD-10-CM

## 2022-08-08 DIAGNOSIS — T50.905A ADVERSE EFFECT OF UNSPECIFIED DRUGS, MEDICAMENTS AND BIOLOGI: ICD-10-CM

## 2022-08-08 DIAGNOSIS — K29.70 GASTRITIS, UNSPECIFIED, WITHOUT BLEEDING: ICD-10-CM

## 2022-08-08 DIAGNOSIS — K44.9 DIAPHRAGMATIC HERNIA WITHOUT OBSTRUCTION OR GANGRENE: ICD-10-CM

## 2022-08-08 PROCEDURE — 99214 OFFICE O/P EST MOD 30 MIN: CPT | Performed by: NURSE PRACTITIONER

## 2022-09-14 ENCOUNTER — APPOINTMENT (OUTPATIENT)
Dept: SLEEP MEDICINE | Facility: HOSPITAL | Age: 72
End: 2022-09-14

## 2023-05-31 VITALS
RESPIRATION RATE: 17 BRPM | SYSTOLIC BLOOD PRESSURE: 147 MMHG | OXYGEN SATURATION: 99 % | DIASTOLIC BLOOD PRESSURE: 70 MMHG | HEART RATE: 96 BPM | SYSTOLIC BLOOD PRESSURE: 129 MMHG | RESPIRATION RATE: 11 BRPM | SYSTOLIC BLOOD PRESSURE: 139 MMHG | DIASTOLIC BLOOD PRESSURE: 71 MMHG | DIASTOLIC BLOOD PRESSURE: 67 MMHG | HEART RATE: 93 BPM | SYSTOLIC BLOOD PRESSURE: 172 MMHG | HEART RATE: 86 BPM | HEART RATE: 78 BPM | HEART RATE: 80 BPM | SYSTOLIC BLOOD PRESSURE: 136 MMHG | SYSTOLIC BLOOD PRESSURE: 133 MMHG | DIASTOLIC BLOOD PRESSURE: 79 MMHG | DIASTOLIC BLOOD PRESSURE: 65 MMHG | OXYGEN SATURATION: 97 % | HEART RATE: 83 BPM | DIASTOLIC BLOOD PRESSURE: 64 MMHG | TEMPERATURE: 97.9 F | DIASTOLIC BLOOD PRESSURE: 85 MMHG | WEIGHT: 205 LBS | DIASTOLIC BLOOD PRESSURE: 74 MMHG | HEIGHT: 71 IN | OXYGEN SATURATION: 98 % | HEART RATE: 87 BPM | TEMPERATURE: 97.7 F | RESPIRATION RATE: 16 BRPM | RESPIRATION RATE: 20 BRPM | RESPIRATION RATE: 25 BRPM | SYSTOLIC BLOOD PRESSURE: 132 MMHG | TEMPERATURE: 97.3 F | SYSTOLIC BLOOD PRESSURE: 125 MMHG | OXYGEN SATURATION: 100 % | SYSTOLIC BLOOD PRESSURE: 140 MMHG | HEART RATE: 95 BPM | SYSTOLIC BLOOD PRESSURE: 150 MMHG | OXYGEN SATURATION: 94 % | DIASTOLIC BLOOD PRESSURE: 73 MMHG | RESPIRATION RATE: 12 BRPM | SYSTOLIC BLOOD PRESSURE: 130 MMHG | OXYGEN SATURATION: 92 % | RESPIRATION RATE: 21 BRPM | HEART RATE: 88 BPM

## 2023-06-06 ENCOUNTER — AMBULATORY SURGICAL CENTER (AMBULATORY)
Dept: URBAN - METROPOLITAN AREA SURGERY 17 | Facility: SURGERY | Age: 73
End: 2023-06-06

## 2023-06-06 DIAGNOSIS — K57.30 DIVERTICULOSIS OF LARGE INTESTINE WITHOUT PERFORATION OR ABS: ICD-10-CM

## 2023-06-06 DIAGNOSIS — Z86.010 PERSONAL HISTORY OF COLONIC POLYPS: ICD-10-CM

## 2023-06-06 PROCEDURE — G0105 COLORECTAL SCRN; HI RISK IND: HCPCS | Performed by: INTERNAL MEDICINE

## 2023-06-06 NOTE — SERVICEHPINOTES
I had the pleasure of seeing Ms. Cheung. As you know, she is very pleasant 72-year-old  female patient of Dr. Malhotra with a history of GERD, constipation and colon polyps. She was last seen in our office 11/22/2021 and was doing well... underwent EGD 2/2/2022 for gastric mapping given history of gastric intestinal metaplasia, with negative biopsies. Here today for routine follow-up.GERD is well-controlled with OTC Prilosec QD. She does not report any upper GI complaints at today's office visit. She denies dysphagia, odynophagia, heartburn, reflux, nausea, vomiting, abdominal pain, bloating or distention.With regards to her constipation, bowels will not move unless she utilizes Dulcolax every 3-4 days - will take single dose, followed by another dose om 2nd day - and still no movement, will take 2 on the 3rd day, and that will move her bowels. She  denies change in bowel pattern, blood in her stool, melena, diarrhea, constipation, unexplained weight loss, rectal pain/itching/burning, fecal leaking or incontinence.Tried: Arlene REVIEWED:brCN 4/24/2013 bx w/ TA x1, hyperplastic polyps ×2, others were benign colonic mucosabrEGD 5/16/2018 as below bx w/ chronic gastritis and focal intestinal metaplasia, (-) HPbrCN 5/16/2018 as below bx TA x1 and SSA [id number] 2/2/2022 as below bx of stomach all (-) for IM

## 2023-12-13 ENCOUNTER — OFFICE (AMBULATORY)
Dept: URBAN - METROPOLITAN AREA CLINIC 76 | Facility: CLINIC | Age: 73
End: 2023-12-13

## 2023-12-13 VITALS
WEIGHT: 217 LBS | DIASTOLIC BLOOD PRESSURE: 64 MMHG | OXYGEN SATURATION: 99 % | HEIGHT: 71 IN | SYSTOLIC BLOOD PRESSURE: 125 MMHG | HEART RATE: 69 BPM

## 2023-12-13 DIAGNOSIS — K59.00 CONSTIPATION, UNSPECIFIED: ICD-10-CM

## 2023-12-13 DIAGNOSIS — K21.9 GASTRO-ESOPHAGEAL REFLUX DISEASE WITHOUT ESOPHAGITIS: ICD-10-CM

## 2023-12-13 DIAGNOSIS — Z86.010 PERSONAL HISTORY OF COLONIC POLYPS: ICD-10-CM

## 2023-12-13 DIAGNOSIS — R11.2 NAUSEA WITH VOMITING, UNSPECIFIED: ICD-10-CM

## 2023-12-13 PROBLEM — Z12.11 SURVEILLANCE DUE TO PRIOR COLONIC NEOPLASIA: Status: ACTIVE | Noted: 2018-05-16

## 2023-12-13 PROBLEM — K64.8 OTHER HEMORRHOIDS: Status: ACTIVE | Noted: 2018-05-16

## 2023-12-13 PROBLEM — D12.3 BENIGN NEOPLASM OF TRANSVERSE COLON: Status: ACTIVE | Noted: 2018-05-16

## 2023-12-13 PROBLEM — K31.89 OTHER DISEASES OF STOMACH AND DUODENUM: Status: ACTIVE | Noted: 2022-02-02

## 2023-12-13 PROBLEM — K57.30 DVRTCLOS OF LG INT W/O PERFORATION OR ABSCESS W/O BLEEDING: Status: ACTIVE | Noted: 2018-05-16

## 2023-12-13 PROBLEM — K44.9 DIAPHRAGMATIC HERNIA WITHOUT OBSTRUCTION OR GANGRENE: Status: ACTIVE | Noted: 2018-05-16

## 2023-12-13 PROBLEM — K57.30 DIVERTICULOSIS OF LARGE INTESTINE WITHOUT PERFORATION OR ABS: Status: ACTIVE | Noted: 2023-06-06

## 2023-12-13 PROBLEM — K31.7 POLYP OF STOMACH AND DUODENUM: Status: ACTIVE | Noted: 2022-02-02

## 2023-12-13 PROBLEM — D12.0 BENIGN NEOPLASM OF CECUM: Status: ACTIVE | Noted: 2018-05-16

## 2023-12-13 PROBLEM — K29.70 GASTRITIS, UNSPECIFIED, WITHOUT BLEEDING: Status: ACTIVE | Noted: 2018-05-16

## 2023-12-13 PROCEDURE — 99214 OFFICE O/P EST MOD 30 MIN: CPT | Performed by: INTERNAL MEDICINE

## 2023-12-13 NOTE — SERVICEHPINOTES
Ms. Cheung is here for follow-up.  Her main complaint continues to be related to constipation.  She can skip several days and if that happens she develops bloating and upper GI symptoms including nausea and even emesis at times.  She did take Linzess that one and it worked for "a while" but it stopped working. she's tried over-the-counter therapies including MiraLAX and fiber.  She has tried to increase her intake of fruits and vegetables.  I did a colonoscopy on her in June of this year.  She has a history of polyps will be due for colonoscopy in 2030.  She does report a lot of stress.  Unfortunately her  is in hospice.  She is getting ready to retire.  Her daughter is getting  soon. 
br
br As above if she gets severely constipated she develops nausea and vomiting.  She does have history of reflux.  She takes Prilosec over-the-counter.  There is no dysphagia, odynophagia melena or hematemesis.  She is in no acute distress.  There is no change in her past medical or past surgical history...

## 2024-02-13 ENCOUNTER — OFFICE (AMBULATORY)
Dept: URBAN - METROPOLITAN AREA CLINIC 51 | Facility: CLINIC | Age: 74
End: 2024-02-13

## 2024-02-13 DIAGNOSIS — K62.89 OTHER SPECIFIED DISEASES OF ANUS AND RECTUM: ICD-10-CM

## 2024-02-13 DIAGNOSIS — K59.00 CONSTIPATION, UNSPECIFIED: ICD-10-CM

## 2024-02-13 PROCEDURE — 91120: CPT | Mod: 59 | Performed by: INTERNAL MEDICINE

## 2024-02-13 PROCEDURE — 91122: CPT | Performed by: INTERNAL MEDICINE
